# Patient Record
Sex: FEMALE | Race: WHITE | Employment: OTHER | URBAN - METROPOLITAN AREA
[De-identification: names, ages, dates, MRNs, and addresses within clinical notes are randomized per-mention and may not be internally consistent; named-entity substitution may affect disease eponyms.]

---

## 2018-01-17 PROBLEM — R91.1 LUNG NODULE: Status: ACTIVE | Noted: 2018-01-17

## 2018-01-17 PROBLEM — R59.1 LYMPHADENOPATHY: Status: ACTIVE | Noted: 2018-01-17

## 2018-02-08 PROBLEM — R13.10 DIFFICULTY SWALLOWING: Status: ACTIVE | Noted: 2018-02-08

## 2018-02-27 PROBLEM — I10 ESSENTIAL HYPERTENSION: Status: ACTIVE | Noted: 2018-02-27

## 2018-04-13 ENCOUNTER — OFFICE VISIT (OUTPATIENT)
Dept: PRIMARY CARE CLINIC | Age: 53
End: 2018-04-13
Payer: COMMERCIAL

## 2018-04-13 VITALS
OXYGEN SATURATION: 96 % | HEIGHT: 65 IN | HEART RATE: 87 BPM | WEIGHT: 283 LBS | BODY MASS INDEX: 47.15 KG/M2 | TEMPERATURE: 97.9 F | DIASTOLIC BLOOD PRESSURE: 86 MMHG | SYSTOLIC BLOOD PRESSURE: 128 MMHG

## 2018-04-13 DIAGNOSIS — R18.8 OTHER ASCITES: ICD-10-CM

## 2018-04-13 DIAGNOSIS — I10 ESSENTIAL HYPERTENSION: ICD-10-CM

## 2018-04-13 DIAGNOSIS — M25.552 LEFT HIP PAIN: Primary | ICD-10-CM

## 2018-04-13 DIAGNOSIS — E78.5 HYPERLIPIDEMIA, UNSPECIFIED HYPERLIPIDEMIA TYPE: ICD-10-CM

## 2018-04-13 PROCEDURE — 99213 OFFICE O/P EST LOW 20 MIN: CPT | Performed by: INTERNAL MEDICINE

## 2018-05-20 LAB
ALBUMIN SERPL-MCNC: 3.7 G/DL
ALP BLD-CCNC: 85 U/L
ALT SERPL-CCNC: 32 U/L
ANION GAP SERPL CALCULATED.3IONS-SCNC: NORMAL MMOL/L
AST SERPL-CCNC: 20 U/L
BASOPHILS ABSOLUTE: 0.1 /ΜL
BASOPHILS RELATIVE PERCENT: 0.5 %
BILIRUB SERPL-MCNC: 0.3 MG/DL (ref 0.1–1.4)
BUN BLDV-MCNC: 13 MG/DL
CALCIUM SERPL-MCNC: 8.7 MG/DL
CHLORIDE BLD-SCNC: 104 MMOL/L
CHOLESTEROL, TOTAL: 234 MG/DL
CHOLESTEROL/HDL RATIO: ABNORMAL
CO2: 30 MMOL/L
CREAT SERPL-MCNC: 0.63 MG/DL
EOSINOPHILS ABSOLUTE: 0.3 /ΜL
EOSINOPHILS RELATIVE PERCENT: 3.5 %
GFR CALCULATED: >60
GLUCOSE BLD-MCNC: 92 MG/DL
HCT VFR BLD CALC: 43.7 % (ref 36–46)
HDLC SERPL-MCNC: 32 MG/DL (ref 35–70)
HEMOGLOBIN: 13.7 G/DL (ref 12–16)
LDL CHOLESTEROL CALCULATED: 132 MG/DL (ref 0–160)
LYMPHOCYTES ABSOLUTE: 2 /ΜL
LYMPHOCYTES RELATIVE PERCENT: 20.2 %
MCH RBC QN AUTO: 27.8 PG
MCHC RBC AUTO-ENTMCNC: 31.4 G/DL
MCV RBC AUTO: 88.8 FL
MONOCYTES ABSOLUTE: 0.6 /ΜL
MONOCYTES RELATIVE PERCENT: 6 %
NEUTROPHILS ABSOLUTE: 6.7 /ΜL
NEUTROPHILS RELATIVE PERCENT: 68.7 %
PLATELET # BLD: 304 K/ΜL
PMV BLD AUTO: 10 FL
POTASSIUM SERPL-SCNC: 4.2 MMOL/L
RBC # BLD: 4.92 10^6/ΜL
SODIUM BLD-SCNC: 140 MMOL/L
TOTAL PROTEIN: 8.4
TRIGL SERPL-MCNC: 202 MG/DL
VLDLC SERPL CALC-MCNC: 40 MG/DL
WBC # BLD: 9.8 10^3/ML

## 2018-05-21 DIAGNOSIS — I10 ESSENTIAL HYPERTENSION: ICD-10-CM

## 2018-05-21 DIAGNOSIS — E78.5 HYPERLIPIDEMIA, UNSPECIFIED HYPERLIPIDEMIA TYPE: ICD-10-CM

## 2018-05-22 ENCOUNTER — TELEPHONE (OUTPATIENT)
Dept: PRIMARY CARE CLINIC | Age: 53
End: 2018-05-22

## 2018-05-24 ENCOUNTER — HOSPITAL ENCOUNTER (OUTPATIENT)
Age: 53
Discharge: HOME | End: 2018-05-26
Payer: COMMERCIAL

## 2018-05-24 ENCOUNTER — OFFICE VISIT (OUTPATIENT)
Dept: PRIMARY CARE CLINIC | Age: 53
End: 2018-05-24
Payer: COMMERCIAL

## 2018-05-24 VITALS
HEART RATE: 87 BPM | OXYGEN SATURATION: 96 % | BODY MASS INDEX: 46.98 KG/M2 | DIASTOLIC BLOOD PRESSURE: 88 MMHG | SYSTOLIC BLOOD PRESSURE: 138 MMHG | TEMPERATURE: 97.5 F | HEIGHT: 65 IN | WEIGHT: 282 LBS

## 2018-05-24 DIAGNOSIS — R60.9 SWELLING: ICD-10-CM

## 2018-05-24 DIAGNOSIS — M79.605 PAIN OF LEFT LOWER EXTREMITY: ICD-10-CM

## 2018-05-24 DIAGNOSIS — R53.83 FATIGUE, UNSPECIFIED TYPE: ICD-10-CM

## 2018-05-24 DIAGNOSIS — I10 ESSENTIAL HYPERTENSION: Primary | ICD-10-CM

## 2018-05-24 LAB
C-REACTIVE PROTEIN: 1.8 MG/DL (ref 0–0.4)
RHEUMATOID FACTOR: <10 IU/ML (ref 0–13)
TSH SERPL DL<=0.05 MIU/L-ACNC: 3.81 UIU/ML (ref 0.27–4.2)

## 2018-05-24 PROCEDURE — 84443 ASSAY THYROID STIM HORMONE: CPT

## 2018-05-24 PROCEDURE — 99214 OFFICE O/P EST MOD 30 MIN: CPT | Performed by: INTERNAL MEDICINE

## 2018-05-24 PROCEDURE — 86140 C-REACTIVE PROTEIN: CPT

## 2018-05-24 PROCEDURE — 86038 ANTINUCLEAR ANTIBODIES: CPT

## 2018-05-24 PROCEDURE — 85651 RBC SED RATE NONAUTOMATED: CPT

## 2018-05-24 PROCEDURE — 86431 RHEUMATOID FACTOR QUANT: CPT

## 2018-05-24 PROCEDURE — 86200 CCP ANTIBODY: CPT

## 2018-05-24 RX ORDER — LOSARTAN POTASSIUM 100 MG/1
100 TABLET ORAL DAILY
Qty: 30 TABLET | Refills: 5 | Status: SHIPPED | OUTPATIENT
Start: 2018-05-24 | End: 2018-05-31 | Stop reason: SDUPTHER

## 2018-05-25 LAB
ANTI-NUCLEAR ANTIBODY (ANA): NEGATIVE
SEDIMENTATION RATE, ERYTHROCYTE: 36 MM/HR (ref 0–20)

## 2018-05-26 LAB — CCP IGG ANTIBODIES: 30 UNITS (ref 0–19)

## 2018-05-27 ENCOUNTER — HOSPITAL ENCOUNTER (OUTPATIENT)
Dept: ULTRASOUND IMAGING | Age: 53
Discharge: HOME | End: 2018-05-29
Payer: COMMERCIAL

## 2018-05-27 DIAGNOSIS — R18.8 OTHER ASCITES: ICD-10-CM

## 2018-05-27 PROCEDURE — 76700 US EXAM ABDOM COMPLETE: CPT

## 2018-05-31 ENCOUNTER — TELEPHONE (OUTPATIENT)
Dept: PRIMARY CARE CLINIC | Age: 53
End: 2018-05-31

## 2018-05-31 DIAGNOSIS — I10 ESSENTIAL HYPERTENSION: ICD-10-CM

## 2018-05-31 RX ORDER — LOSARTAN POTASSIUM 100 MG/1
100 TABLET ORAL DAILY
Qty: 30 TABLET | Refills: 5 | Status: SHIPPED | OUTPATIENT
Start: 2018-05-31 | End: 2018-12-27 | Stop reason: SDUPTHER

## 2018-06-19 ENCOUNTER — OFFICE VISIT (OUTPATIENT)
Dept: PRIMARY CARE CLINIC | Age: 53
End: 2018-06-19
Payer: COMMERCIAL

## 2018-06-19 VITALS
HEART RATE: 76 BPM | SYSTOLIC BLOOD PRESSURE: 150 MMHG | TEMPERATURE: 97 F | BODY MASS INDEX: 47.09 KG/M2 | WEIGHT: 283 LBS | OXYGEN SATURATION: 96 % | DIASTOLIC BLOOD PRESSURE: 90 MMHG

## 2018-06-19 DIAGNOSIS — M79.89 LEG SWELLING: ICD-10-CM

## 2018-06-19 DIAGNOSIS — M79.652 PAIN OF LEFT THIGH: ICD-10-CM

## 2018-06-19 DIAGNOSIS — R10.9 ABDOMINAL PAIN, UNSPECIFIED ABDOMINAL LOCATION: ICD-10-CM

## 2018-06-19 DIAGNOSIS — J32.9 CHRONIC CONGESTION OF PARANASAL SINUS: Primary | ICD-10-CM

## 2018-06-19 PROCEDURE — 99214 OFFICE O/P EST MOD 30 MIN: CPT | Performed by: INTERNAL MEDICINE

## 2018-06-19 RX ORDER — FUROSEMIDE 40 MG/1
40 TABLET ORAL DAILY
Qty: 30 TABLET | Refills: 3 | Status: SHIPPED | OUTPATIENT
Start: 2018-06-19 | End: 2019-03-22 | Stop reason: SDUPTHER

## 2018-07-08 ENCOUNTER — HOSPITAL ENCOUNTER (OUTPATIENT)
Dept: CT IMAGING | Age: 53
Discharge: HOME | End: 2018-07-10
Payer: COMMERCIAL

## 2018-07-08 DIAGNOSIS — R10.9 ABDOMINAL PAIN, UNSPECIFIED ABDOMINAL LOCATION: ICD-10-CM

## 2018-07-08 PROCEDURE — 74176 CT ABD & PELVIS W/O CONTRAST: CPT

## 2018-07-09 ENCOUNTER — TELEPHONE (OUTPATIENT)
Dept: PRIMARY CARE CLINIC | Age: 53
End: 2018-07-09

## 2018-07-09 DIAGNOSIS — R32 BLADDER LEAK: Primary | ICD-10-CM

## 2018-07-10 ENCOUNTER — TELEPHONE (OUTPATIENT)
Dept: PRIMARY CARE CLINIC | Age: 53
End: 2018-07-10

## 2018-07-10 DIAGNOSIS — R14.0 ABDOMINAL BLOATING: Primary | ICD-10-CM

## 2018-07-10 NOTE — TELEPHONE ENCOUNTER
Patient states you referred her to Dr. Rod Ba for gastro, but she was not fond of him and would like to be sent to Dr. Brandy Briceno at [phone].

## 2018-07-13 ENCOUNTER — TELEPHONE (OUTPATIENT)
Dept: PRIMARY CARE CLINIC | Age: 53
End: 2018-07-13

## 2018-07-13 DIAGNOSIS — F41.8 SITUATIONAL ANXIETY: Primary | ICD-10-CM

## 2018-07-13 RX ORDER — ALPRAZOLAM 0.25 MG/1
0.25 TABLET ORAL 3 TIMES DAILY PRN
Qty: 4 TABLET | Refills: 0 | Status: SHIPPED | OUTPATIENT
Start: 2018-07-13 | End: 2018-08-13

## 2018-10-12 ENCOUNTER — TELEPHONE (OUTPATIENT)
Dept: PRIMARY CARE CLINIC | Age: 53
End: 2018-10-12

## 2018-10-12 ENCOUNTER — OFFICE VISIT (OUTPATIENT)
Dept: ENT CLINIC | Age: 53
End: 2018-10-12
Payer: COMMERCIAL

## 2018-10-12 ENCOUNTER — TELEPHONE (OUTPATIENT)
Dept: ENT CLINIC | Age: 53
End: 2018-10-12

## 2018-10-12 VITALS
OXYGEN SATURATION: 94 % | HEART RATE: 85 BPM | WEIGHT: 275 LBS | DIASTOLIC BLOOD PRESSURE: 96 MMHG | BODY MASS INDEX: 45.82 KG/M2 | HEIGHT: 65 IN | SYSTOLIC BLOOD PRESSURE: 162 MMHG

## 2018-10-12 DIAGNOSIS — J34.89 NASAL OBSTRUCTION: Primary | ICD-10-CM

## 2018-10-12 DIAGNOSIS — J34.2 NASAL SEPTAL DEVIATION: ICD-10-CM

## 2018-10-12 DIAGNOSIS — J34.89 NASAL VALVE COLLAPSE: ICD-10-CM

## 2018-10-12 PROCEDURE — 99204 OFFICE O/P NEW MOD 45 MIN: CPT | Performed by: OTOLARYNGOLOGY

## 2018-10-12 RX ORDER — FLUTICASONE PROPIONATE 50 MCG
2 SPRAY, SUSPENSION (ML) NASAL DAILY
Qty: 1 BOTTLE | Refills: 3 | Status: SHIPPED | OUTPATIENT
Start: 2018-10-12 | End: 2019-05-28

## 2018-12-27 ENCOUNTER — HOSPITAL ENCOUNTER (OUTPATIENT)
Age: 53
Discharge: HOME | End: 2018-12-29
Payer: COMMERCIAL

## 2018-12-27 ENCOUNTER — OFFICE VISIT (OUTPATIENT)
Dept: PRIMARY CARE CLINIC | Age: 53
End: 2018-12-27
Payer: COMMERCIAL

## 2018-12-27 VITALS
WEIGHT: 290 LBS | SYSTOLIC BLOOD PRESSURE: 138 MMHG | OXYGEN SATURATION: 97 % | BODY MASS INDEX: 48.26 KG/M2 | DIASTOLIC BLOOD PRESSURE: 86 MMHG | TEMPERATURE: 98.2 F | HEART RATE: 86 BPM

## 2018-12-27 DIAGNOSIS — I10 ESSENTIAL HYPERTENSION: ICD-10-CM

## 2018-12-27 DIAGNOSIS — M25.569 CHRONIC KNEE PAIN, UNSPECIFIED LATERALITY: ICD-10-CM

## 2018-12-27 DIAGNOSIS — F32.A DEPRESSION, UNSPECIFIED DEPRESSION TYPE: Primary | ICD-10-CM

## 2018-12-27 DIAGNOSIS — G89.29 CHRONIC KNEE PAIN, UNSPECIFIED LATERALITY: ICD-10-CM

## 2018-12-27 DIAGNOSIS — E66.01 CLASS 3 SEVERE OBESITY DUE TO EXCESS CALORIES WITHOUT SERIOUS COMORBIDITY WITH BODY MASS INDEX (BMI) OF 45.0 TO 49.9 IN ADULT (HCC): ICD-10-CM

## 2018-12-27 LAB
ANION GAP SERPL CALCULATED.3IONS-SCNC: 10 MMOL/L (ref 7–16)
BUN BLDV-MCNC: 14 MG/DL (ref 6–20)
CALCIUM SERPL-MCNC: 9.2 MG/DL (ref 8.6–10.2)
CHLORIDE BLD-SCNC: 99 MMOL/L (ref 98–107)
CO2: 32 MMOL/L (ref 22–29)
CREAT SERPL-MCNC: 0.9 MG/DL (ref 0.5–1)
GFR AFRICAN AMERICAN: >60
GFR NON-AFRICAN AMERICAN: >60 ML/MIN/1.73
GLUCOSE BLD-MCNC: 107 MG/DL (ref 74–99)
HCT VFR BLD CALC: 43.4 % (ref 34–48)
HEMOGLOBIN: 13.5 G/DL (ref 11.5–15.5)
MCH RBC QN AUTO: 27.7 PG (ref 26–35)
MCHC RBC AUTO-ENTMCNC: 31.1 % (ref 32–34.5)
MCV RBC AUTO: 89.1 FL (ref 80–99.9)
PDW BLD-RTO: 13.7 FL (ref 11.5–15)
PLATELET # BLD: 280 E9/L (ref 130–450)
PMV BLD AUTO: 11 FL (ref 7–12)
POTASSIUM SERPL-SCNC: 4.3 MMOL/L (ref 3.5–5)
RBC # BLD: 4.87 E12/L (ref 3.5–5.5)
SODIUM BLD-SCNC: 141 MMOL/L (ref 132–146)
WBC # BLD: 9.6 E9/L (ref 4.5–11.5)

## 2018-12-27 PROCEDURE — 80048 BASIC METABOLIC PNL TOTAL CA: CPT

## 2018-12-27 PROCEDURE — 99214 OFFICE O/P EST MOD 30 MIN: CPT | Performed by: INTERNAL MEDICINE

## 2018-12-27 PROCEDURE — 85027 COMPLETE CBC AUTOMATED: CPT

## 2018-12-27 RX ORDER — LOSARTAN POTASSIUM 100 MG/1
100 TABLET ORAL DAILY
Qty: 30 TABLET | Refills: 5 | Status: SHIPPED | OUTPATIENT
Start: 2018-12-27 | End: 2019-03-22 | Stop reason: SDUPTHER

## 2018-12-27 RX ORDER — SERTRALINE HYDROCHLORIDE 25 MG/1
25 TABLET, FILM COATED ORAL DAILY
Qty: 30 TABLET | Refills: 1 | Status: SHIPPED | OUTPATIENT
Start: 2018-12-27 | End: 2019-05-28

## 2018-12-27 NOTE — PATIENT INSTRUCTIONS
Patient Education        Advance Care Planning: Care Instructions  Your Care Instructions    It can be hard to live with an illness that cannot be cured. But if your health is getting worse, you may want to make decisions about end-of-life care. Planning for the end of your life does not mean that you are giving up. It is a way to make sure that your wishes are met. Clearly stating your wishes can make it easier for your loved ones. Making plans while you are still able may also ease your mind and make your final days less stressful and more meaningful. Follow-up care is a key part of your treatment and safety. Be sure to make and go to all appointments, and call your doctor if you are having problems. It's also a good idea to know your test results and keep a list of the medicines you take. What can you do to plan for the end of life? · You can bring these issues up with your doctor. You do not need to wait until your doctor starts the conversation. You might start with \"I would not be willing to live with . Neisha Yoana Neisha Yoana Neisha Yoana \" When you complete this sentence it helps your doctor understand your wishes. · Talk openly and honestly with your doctor. This is the best way to understand the decisions you will need to make as your health changes. Know that you can always change your mind. · Ask your doctor about commonly used life-support measures. These include tube feedings, breathing machines, and fluids given through a vein (IV). Understanding these treatments will help you decide whether you want them. · You may choose to have these life-supporting treatments for a limited time. This allows a trial period to see whether they will help you. You may also decide that you want your doctor to take only certain measures to keep you alive. It is important to spell out these conditions so that your doctor and family understand them. · Talk to your doctor about how long you are likely to live.  He or she may be able to give you an idea of what usually happens with your specific illness. · Think about preparing papers that state your wishes. This way there will not be any confusion about what you want. You can change your instructions at any time. Which papers should you prepare? Advance directives are legal papers that tell doctors how you want to be cared for at the end of your life. You do not need a  to write these papers. Ask your doctor or your state health department for information on how to write your advance directives. They may have the forms for each of these types of papers. Make sure your doctor has a copy of these on file, and give a copy to a family member or close friend. · Consider a do-not-resuscitate order (DNR). This order asks that no extra treatments be done if your heart stops or you stop breathing. Extra treatments may include cardiopulmonary resuscitation (CPR), electrical shock to restart your heart, or a machine to breathe for you. If you decide to have a DNR order, ask your doctor to explain and write it. Place the order in your home where everyone can easily see it. · Consider a living will. A living will explains your wishes about life support and other treatments at the end of your life if you become unable to speak for yourself. Living wong tell doctors to use or not use treatments that would keep you alive. You must have one or two witnesses or a notary present when you sign this form. · Consider a durable power of  for health care. This allows you to name a person to make decisions about your care if you are not able to. Most people ask a close friend or family member. Talk to this person about the kinds of treatments you want and those that you do not want. Make sure this person understands your wishes. These legal papers are simple to change. Tell your doctor what you want to change, and ask him or her to make a note in your medical file.  Give your family updated copies of the papers. Where can you learn more? Go to [email or web address]://[email or web address] org and sign in to your [employer] account. Enter P184 in the SIGKAT box to learn more about \"Advance Care Planning: Care Instructions. \"     If you do not have an account, please click on the \"Sign Up Now\" link. Current as of: April 19, 2018  Content Version: 11.8  © 5600-2255 Healthwise, Incorporated. Care instructions adapted under license by [facility] ([facility]). If you have questions about a medical condition or this instruction, always ask your healthcare professional. [address] any warranty or liability for your use of this information.

## 2018-12-27 NOTE — PROGRESS NOTES
12/27/18    Karlo Perez, a female of 46 y.o. came to the office     Karlo Perez presents today for blood pressure check. She has a history of chronic knee pain, edema obesity sarcoidosis and hypertension. She has gained approximately 15 pounds since her last appointment. She denies any changes in her exercise or dietary habits. She also has problems with both anxiety and depression. She is caring for several grandchildren. Her  also has end-stage heart failure is due to have a heart transplant. She also has been following up with rheumatology and was started on plaque little. He has not noticed much change since initiating this medication. She does follow up with orthopedics for her hip and knee pain. She last received a knee injection. She was recommended to have a hip injection but is holding off on this. She also intimately takes Lasix for swelling. Patient Active Problem List   Diagnosis    Lung nodule    Class 3 obesity in adult    Essential hypertension        No Known Allergies    Current Outpatient Prescriptions on File Prior to Visit   Medication Sig Dispense Refill    fluticasone (FLONASE) 50 MCG/ACT nasal spray 2 sprays by Nasal route daily 1 Bottle 3    furosemide (LASIX) 40 MG tablet Take 1 tablet by mouth daily 30 tablet 3    aspirin 81 MG tablet Take 81 mg by mouth daily       No current facility-administered medications on file prior to visit. Review of Systems  Constitutional:Negative for activity change, appetite change, chills, fatigue and fever. Respiratory: Negative for choking, chest tightness, shortness of breath and wheezing. Cardiovascular: Negative for chest pain, palpitations and leg swelling. Gastrointestinal: Negative for abdominal distention, constipation, diarrhea, nausea and vomiting. Musculoskeletal: Negative for arthralgias, back pain, gait problem and joint swelling. Neurological: Negative for dizziness, weakness,numbness and headaches.

## 2019-03-18 LAB
ANGIOTENSIN CONVERTING ENZYME: 51 U/L (ref 14–82)
ANTINUCLEAR ANTIBODIES (ANA): POSITIVE
LYME IGG/IGM AB: <0.91 ISR (ref 0–0.9)
Lab: ABNORMAL
RPR: NON REACTIVE
SPECKLED PATTERN: ABNORMAL

## 2019-03-22 ENCOUNTER — OFFICE VISIT (OUTPATIENT)
Dept: PRIMARY CARE CLINIC | Age: 54
End: 2019-03-22
Payer: COMMERCIAL

## 2019-03-22 VITALS
BODY MASS INDEX: 48.92 KG/M2 | WEIGHT: 293 LBS | DIASTOLIC BLOOD PRESSURE: 70 MMHG | OXYGEN SATURATION: 96 % | HEART RATE: 94 BPM | SYSTOLIC BLOOD PRESSURE: 124 MMHG | TEMPERATURE: 97 F

## 2019-03-22 DIAGNOSIS — I10 ESSENTIAL HYPERTENSION: ICD-10-CM

## 2019-03-22 DIAGNOSIS — M79.2 NEUROPATHIC PAIN: ICD-10-CM

## 2019-03-22 DIAGNOSIS — M79.89 LEG SWELLING: ICD-10-CM

## 2019-03-22 DIAGNOSIS — R04.0 BLEEDING NOSE: ICD-10-CM

## 2019-03-22 DIAGNOSIS — F32.A DEPRESSION, UNSPECIFIED DEPRESSION TYPE: Primary | ICD-10-CM

## 2019-03-22 PROCEDURE — 99214 OFFICE O/P EST MOD 30 MIN: CPT | Performed by: INTERNAL MEDICINE

## 2019-03-22 RX ORDER — SODIUM CHLORIDE/ALOE VERA
GEL (GRAM) NASAL PRN
Qty: 1 TUBE | Refills: 3 | Status: SHIPPED | OUTPATIENT
Start: 2019-03-22 | End: 2019-05-28

## 2019-03-22 RX ORDER — DULOXETIN HYDROCHLORIDE 20 MG/1
20 CAPSULE, DELAYED RELEASE ORAL DAILY
Qty: 30 CAPSULE | Refills: 3 | Status: SHIPPED | OUTPATIENT
Start: 2019-03-22 | End: 2019-05-28 | Stop reason: SDUPTHER

## 2019-03-22 RX ORDER — PREDNISOLONE ACETATE 10 MG/ML
1 SUSPENSION/ DROPS OPHTHALMIC 4 TIMES DAILY
COMMUNITY
End: 2020-01-22

## 2019-03-22 RX ORDER — CYCLOPENTOLATE HYDROCHLORIDE 10 MG/ML
1 SOLUTION/ DROPS OPHTHALMIC ONCE
COMMUNITY
End: 2020-01-29

## 2019-03-22 RX ORDER — FUROSEMIDE 40 MG/1
40 TABLET ORAL DAILY
Qty: 30 TABLET | Refills: 5 | Status: SHIPPED | OUTPATIENT
Start: 2019-03-22 | End: 2019-05-28 | Stop reason: SDUPTHER

## 2019-03-22 RX ORDER — LOSARTAN POTASSIUM 100 MG/1
100 TABLET ORAL DAILY
Qty: 30 TABLET | Refills: 5 | Status: SHIPPED | OUTPATIENT
Start: 2019-03-22 | End: 2019-05-28 | Stop reason: SDUPTHER

## 2019-03-22 ASSESSMENT — PATIENT HEALTH QUESTIONNAIRE - PHQ9
SUM OF ALL RESPONSES TO PHQ QUESTIONS 1-9: 0
1. LITTLE INTEREST OR PLEASURE IN DOING THINGS: 0
2. FEELING DOWN, DEPRESSED OR HOPELESS: 0
SUM OF ALL RESPONSES TO PHQ9 QUESTIONS 1 & 2: 0
SUM OF ALL RESPONSES TO PHQ QUESTIONS 1-9: 0

## 2019-05-28 ENCOUNTER — OFFICE VISIT (OUTPATIENT)
Dept: PRIMARY CARE CLINIC | Age: 54
End: 2019-05-28
Payer: COMMERCIAL

## 2019-05-28 ENCOUNTER — TELEPHONE (OUTPATIENT)
Dept: PRIMARY CARE CLINIC | Age: 54
End: 2019-05-28

## 2019-05-28 VITALS
DIASTOLIC BLOOD PRESSURE: 88 MMHG | OXYGEN SATURATION: 97 % | TEMPERATURE: 97.5 F | HEART RATE: 98 BPM | BODY MASS INDEX: 47.43 KG/M2 | SYSTOLIC BLOOD PRESSURE: 138 MMHG | WEIGHT: 285 LBS

## 2019-05-28 DIAGNOSIS — M79.2 NEUROPATHIC PAIN: ICD-10-CM

## 2019-05-28 DIAGNOSIS — F32.A DEPRESSION, UNSPECIFIED DEPRESSION TYPE: ICD-10-CM

## 2019-05-28 DIAGNOSIS — I10 ESSENTIAL HYPERTENSION: ICD-10-CM

## 2019-05-28 DIAGNOSIS — M79.89 LEG SWELLING: ICD-10-CM

## 2019-05-28 PROCEDURE — 99214 OFFICE O/P EST MOD 30 MIN: CPT | Performed by: INTERNAL MEDICINE

## 2019-05-28 RX ORDER — HYDROXYZINE HYDROCHLORIDE 25 MG/1
25 TABLET, FILM COATED ORAL 3 TIMES DAILY PRN
Qty: 30 TABLET | Refills: 1 | Status: SHIPPED | OUTPATIENT
Start: 2019-05-28 | End: 2019-06-27

## 2019-05-28 RX ORDER — DULOXETINE 40 MG/1
20 CAPSULE, DELAYED RELEASE ORAL DAILY
Qty: 30 CAPSULE | Refills: 1 | Status: SHIPPED | OUTPATIENT
Start: 2019-05-28 | End: 2019-07-15 | Stop reason: SDUPTHER

## 2019-05-28 RX ORDER — NAPROXEN 500 MG/1
500 TABLET ORAL 2 TIMES DAILY PRN
Qty: 60 TABLET | Refills: 5 | Status: SHIPPED | OUTPATIENT
Start: 2019-05-28 | End: 2020-01-22

## 2019-05-28 RX ORDER — FUROSEMIDE 40 MG/1
40 TABLET ORAL DAILY
Qty: 30 TABLET | Refills: 5 | Status: SHIPPED | OUTPATIENT
Start: 2019-05-28 | End: 2020-01-29

## 2019-05-28 RX ORDER — LOSARTAN POTASSIUM 100 MG/1
100 TABLET ORAL DAILY
Qty: 30 TABLET | Refills: 5 | Status: SHIPPED | OUTPATIENT
Start: 2019-05-28 | End: 2020-01-29 | Stop reason: SDUPTHER

## 2019-05-28 RX ORDER — DULOXETIN HYDROCHLORIDE 20 MG/1
20 CAPSULE, DELAYED RELEASE ORAL DAILY
Qty: 30 CAPSULE | Refills: 3 | Status: CANCELLED | OUTPATIENT
Start: 2019-05-28

## 2019-05-28 NOTE — PROGRESS NOTES
Eyes: Left eye exhibits no discharge. No scleral icterus. Neck: No tracheal deviation present. No thyromegalypresent. Cardiovascular: Normal rate, regular rhythm, normal heart sounds and intact distal pulses. Exam reveals no gallop and no friction rub. No murmur heard. Pulmonary/Chest: Effort normal and breath sounds normal. No respiratory distress. She has no wheezes. no rales. no tenderness. Musculoskeletal:  no tenderness. Neurological:alert and oriented to person, place, and time. Skin: No diaphoresis. Psychiatric: Tearful at times when discussing the passing of her      ASSESSMENT AND PLAN:    Jalen Mejia was seen today for anxiety. Diagnoses and all orders for this visit:    Leg swelling  -     furosemide (LASIX) 40 MG tablet; Take 1 tablet by mouth daily    Essential hypertension  -     losartan (COZAAR) 100 MG tablet; Take 1 tablet by mouth daily    Depression, unspecified depression type  -     DULoxetine HCl 40 MG CPEP; Take 20 mg by mouth daily  -     hydrOXYzine (ATARAX) 25 MG tablet; Take 1 tablet by mouth 3 times daily as needed for Anxiety    Neuropathic pain  -     naproxen (NAPROSYN) 500 MG tablet; Take 1 tablet by mouth 2 times daily as needed for Pain  -     DULoxetine HCl 40 MG CPEP; Take 20 mg by mouth daily        Tierney Arriola presents today to address her anxiety and depression. Her  unfortunately passed away unexpectedly recently. Today I will increase her Cymbalta to 40 mg. I will also add on Atarax as needed for anxiety. Her chronic issues are stable. I will continue her Cozaar Lasix and Naprosyn for now. She is following up actively with rheumatology and is due to see them today. I will also give her paperwork to excuse her from work while she is grieving. We did discuss the possibility of psychotherapy. She refuses this today as it has not worked in the past. I will continue to address these issues with her moving forward.      Please note that the above documentation was prepared using voice recognition software. Every attempt was made to ensure accuracy but there may be spelling, grammatical, and contextual errors. No follow-ups on file.     Ivette Calvo, DO

## 2019-05-28 NOTE — TELEPHONE ENCOUNTER
[facility] called stating they received a Rx for Cymbalta 40mg but the instructions say to take 20mg dialy. Please clarify. Thank you!

## 2019-05-28 NOTE — LETTER
[address] [facility] [id number]  Phone: [phone]  Fax: [address] [place], [place]        May 28, 2019     Patient: Sejal Segovia   YOB: 1965   Date of Visit: 5/28/2019       To Whom It May Concern: It is my medical opinion that Sejal Segovia should be off work for medical reasons due to her family issues. If you have any questions or concerns, please don't hesitate to call.     Sincerely,        Jasper Christian, DO

## 2019-06-18 ENCOUNTER — OFFICE VISIT (OUTPATIENT)
Dept: RHEUMATOLOGY | Age: 54
End: 2019-06-18
Payer: COMMERCIAL

## 2019-06-18 VITALS
WEIGHT: 293 LBS | BODY MASS INDEX: 48.82 KG/M2 | HEIGHT: 65 IN | DIASTOLIC BLOOD PRESSURE: 88 MMHG | OXYGEN SATURATION: 92 % | TEMPERATURE: 97 F | SYSTOLIC BLOOD PRESSURE: 140 MMHG | HEART RATE: 100 BPM

## 2019-06-18 DIAGNOSIS — M05.79 RHEUMATOID ARTHRITIS INVOLVING MULTIPLE SITES WITH POSITIVE RHEUMATOID FACTOR (HCC): Primary | ICD-10-CM

## 2019-06-18 DIAGNOSIS — D86.9 SARCOIDOSIS: ICD-10-CM

## 2019-06-18 PROCEDURE — 99214 OFFICE O/P EST MOD 30 MIN: CPT | Performed by: INTERNAL MEDICINE

## 2019-06-18 ASSESSMENT — ROUTINE ASSESSMENT OF PATIENT INDEX DATA (RAPID3)
TOTAL RAPID3 SCORE: 14
ON A SCALE OF ONE TO TEN, CONSIDERING ALL THE WAYS IN WHICH ILLNESS AND HEALTH CONDITIONS MAY AFFECT YOU AT THIS TIME, PLEASE INDICATE BELOW HOW YOU ARE DOING:: 6
ON A SCALE OF ONE TO TEN, HOW MUCH PAIN HAVE YOU HAD BECAUSE OF YOUR CONDITION OVER THE PAST WEEK?: 8

## 2019-06-18 NOTE — PATIENT INSTRUCTIONS
increase your chances of quitting for good. · Avoid dust, smoke, and fumes. They can harm your lungs. · Drink plenty of fluids, enough so that your urine is light yellow or clear like water. If you have kidney, heart, or liver disease and have to limit fluids, talk with your doctor before you increase the amount of fluids you drink. · If your doctor recommends it, get more exercise. Walking is a good choice. Bit by bit, increase the amount you walk every day. Try for at least 30 minutes on most days of the week. You also may want to swim, bike, or do other activities. When should you call for help? Call 911 anytime you think you may need emergency care. For example, call if:    · You have severe trouble breathing.     · You passed out (lost consciousness).    Call your doctor now or seek immediate medical care if:    · You have changes in your vision.     · You are very tired, get confused, or urinate a lot.     · Your symptoms do not get better, or they get worse.    Watch closely for changes in your health, and be sure to contact your doctor if you have any problems. Where can you learn more? Go to https://Red [email or web address] [email or web address].Codeanywhere. org and sign in to your [facility] account. Enter [id number] in the [place] box to learn more about \"Sarcoidosis: Care Instructions. \"     If you do not have an account, please click on the \"Sign Up Now\" link. Current as of: September 5, 2018  Content Version: 12.0  © 7965-4915 Healthwise, Incorporated. Care instructions adapted under license by [facility] ([facility]). If you have questions about a medical condition or this instruction, always ask your healthcare professional. Wadie Pean any warranty or liability for your use of this information.          Patient Education        methotrexate (oral)  Pronunciation:  meth oh TREX ate  Brand:  Rheumatrex Dose Pack, VíctorxaSandy newman  What is the most important information I should know about methotrexate? Methotrexate is usually not taken every day. You must use the correct dose of methotrexate for your condition. Some people have  after taking methotrexate every day by accident. Do not use methotrexate to treat psoriasis or rheumatoid arthritis if you have low blood cell counts, a bone marrow disorder, liver disease (especially if caused by alcoholism), or if you are pregnant or breast-feeding. Methotrexate can cause serious or life-threatening side effects. Tell your doctor if you have diarrhea, mouth sores, cough, shortness of breath, upper stomach pain, dark urine, numbness or tingling, muscle weakness, confusion, seizure, or skin rash that spreads and causes blistering and peeling. What is methotrexate? Methotrexate interferes with the growth of certain cells of the body, especially cells that reproduce quickly, such as cancer cells, bone marrow cells, and skin cells. Methotrexate is used to treat certain types of cancer of the breast, skin, head and neck, or lung. Methotrexate is also used to treat severe psoriasis and rheumatoid arthritis. Methotrexate is usually given after other medications have been tried without successful treatment of symptoms. Methotrexate may also be used for purposes not listed in this medication guide. What should I discuss with my healthcare provider before taking methotrexate? You should not use methotrexate if you are allergic to it. Methotrexate should not be used to treat psoriasis or rheumatoid arthritis if you have:  · alcoholism, cirrhosis, or chronic liver disease;  · low blood cell counts;  · a weak immune system or bone marrow disorder; or  · if you are pregnant or breast-feeding. Methotrexate is sometimes used to treat cancer even when patients do have one of the conditions listed above. Your doctor will decide if this treatment is right for you.   Tell your doctor if you have:  · kidney disease;  · lung disease;  · any type of infection; or  · radiation treatments. Methotrexate can harm an unborn baby or cause birth defects, whether the mother or father is taking this medicine. · If you are a woman, do not use methotrexate to treat psoriasis or rheumatoid arthritis if you are pregnant. You may need to have a negative pregnancy test before starting this treatment. Use an effective form of birth control while you are taking methotrexate, and for 6 months after your last dose. · If you are a man, use a condom to keep from causing a pregnancy while you are using methotrexate. Continue using condoms for at least 3 months after your last dose. · Tell your doctor right away if a pregnancy occurs while either the mother or the father is taking methotrexate. This medicine may affect fertility (ability to have children) in both men and women. However, it is important to use birth control to prevent pregnancy because methotrexate may harm the baby if a pregnancy does occur. You should not breast-feed while using this medicine. Do not give this medicine to a child without the advice of a doctor. How should I take methotrexate? Follow all directions on your prescription label and read all medication guides or instruction sheets. Use the medicine exactly as directed. Methotrexate is sometimes taken once or twice per week and not every day. You must use the correct dose. Some people have  after taking methotrexate every day by accident. Ask your doctor or pharmacist if you have questions about your dose of methotrexate or how often to take it. Measure liquid medicine carefully. Use the dosing syringe provided, or use a medicine dose-measuring device (not a kitchen spoon). Methotrexate can be toxic to your organs, and may lower your blood cell counts. Your blood will need to be tested often, and you may need an occasional liver biopsy. Your cancer treatments may be delayed based on the results.   If you need to be sedated for dental work, tell your your mouth or on your lips;  · diarrhea, blood in your urine or stools;  · dry cough, cough with mucus, stabbing chest pain, wheezing, feeling short of breath;  · seizure (convulsions);  · kidney problems --little or no urination, swelling in your feet or ankles;  · liver problems --stomach pain (upper right side), dark urine, jaundice (yellowing of the skin or eyes);  · nerve problems --confusion, weakness, drowsiness, coordination problems, feeling irritable, headache, neck stiffness, vision problems, loss of movement in any part of your body; or  · signs of tumor cell breakdown --confusion, tiredness, numbness or tingling, muscle cramps, muscle weakness, vomiting, diarrhea, fast or slow heart rate, seizure. Side effects may be more likely in older adults. Common side effects may include:  · fever, chills, tiredness, not feeling well;  · mouth sores;  · nausea, upset stomach;  · dizziness; or  · abnormal liver function tests. This is not a complete list of side effects and others may occur. Call your doctor for medical advice about side effects. You may report side effects to FDA at 0-376-FDA-9599. What other drugs will affect methotrexate? Methotrexate can harm your liver, especially if you also use certain other medicines for infections, tuberculosis, depression, birth control, hormone replacement, high cholesterol, heart problems, high blood pressure, seizures, or pain or arthritis medicines (including acetaminophen, Tylenol, Advil, Motrin, and Aleve). Many drugs can affect methotrexate. This includes prescription and over-the-counter medicines, vitamins, and herbal products. Not all possible interactions are listed here. Tell your doctor about all your current medicines and any medicine you start or stop using. Where can I get more information? Your pharmacist can provide more information about methotrexate.   Remember, keep this and all other medicines out of the reach of children, never share your medicines with others, and use this medication only for the indication prescribed. Every effort has been made to ensure that the information provided by [address] [place] [address]  is accurate, up-to-date, and complete, but no guarantee is made to that effect. Drug information contained herein may be time sensitive. [facility] information has been compiled for use by healthcare practitioners and consumers in the [place] and therefore [facility] does not warrant that uses outside of the [place] are appropriate, unless specifically indicated otherwise. [facility]'s drug information does not endorse drugs, diagnose patients or recommend therapy. [facility]'s drug information is an informational resource designed to assist licensed healthcare practitioners in caring for their patients and/or to serve consumers viewing this service as a supplement to, and not a substitute for, the expertise, skill, knowledge and judgment of healthcare practitioners. The absence of a warning for a given drug or drug combination in no way should be construed to indicate that the drug or drug combination is safe, effective or appropriate for any given patient. [facility] does not assume any responsibility for any aspect of healthcare administered with the aid of information [facility] provides. The information contained herein is not intended to cover all possible uses, directions, precautions, warnings, drug interactions, allergic reactions, or adverse effects. If you have questions about the drugs you are taking, check with your doctor, nurse or pharmacist.  Copyright 0587-8504 [address]: 14.01. Revision date: 3/27/2018. Care instructions adapted under license by [facility] ([facility]). If you have questions about a medical condition or this instruction, always ask your healthcare professional. [place] [address] any warranty or liability for your use of this information.

## 2019-06-18 NOTE — PROGRESS NOTES
19    Name: Karthik Lawson  : 1965  Age: 46 y.o. Sex: female    Patient is seen at the request of Jitendra Ferrer DO    Patient presents for a rheumatology consultation regarding bilateral primary iridocyclitis , Joint pain , elevated ESR , C-RP and ANTONIO. Chief Complaint   Patient presents with    Joint Pain    Joint Swelling       Prior Rheum Illness :  Chronic Bilateral Iridocyclitis diagnosed in 2019 by Dr Johana Kussmaul. Patient states that since past one year she has been experiencing intermittent episodes of \"fogginess, decrease vision at  night, with out pain , was seen by Dr Johana Kussmaul and was diagnosed with chronic bilateral iridocyclitis. Per report she has  numerous iris nodules with post synechiae at 12, 2, 3, 9 and 10 clock position, currently suing prednisolone and  cyclopentolate. diffuse swelling particularly in ankles since past few years, swelling gets worse by the end of day, symptoms improved  with diuretics. PCP has completed 2 D Echo which came back \"ok\" per patient report. States that stress test was done few  years back which was negative. At this point reports pain in multiple joints including wrists, hands (thumbs), legs, unable to hold objects,  pain and stiffness increases after long term sitting and standing, denies personal and family history of Psoriasis, IBD,  bloody diarrhea,  Was seen by Dr Kory Ramsey couple of months ago. She was given Prednisone taper and it did not help her. Follows with Orthopedic Dr Zulay Nicole for knee OA. Sister diagnosed with RA.    19:  Patient came for follow-up, labs review and further treatment plan. Reports multiple joint pain. Reports excessive dryness of eyes. In addition she has been following with ophthalmologist for ongoing symptoms of iritis. CT scan of chest was reviewed which was completed one year ago. Patient follows with pulmonologist Dr. Oleg Mccain.  Per assessment she was diagnosed with sarcoidosis based on manifestation of bilateral  mediastinal lymphadenopathy and non-caseating granuloma on BAL. Labs - ESR 62 , C-RP 27.8 elevated ,   RF neg   Anti CCP ab at 32   Hb 10.9  Hepatitis panel is neg   Protein to Cr ratio wnl        Vitals:    06/18/19 0902 06/18/19 0905   BP: (!) 142/88 (!) 140/88   Site: Left Upper Arm Right Upper Arm   Position: Sitting Sitting   Pulse: 100    Temp: 97 °F (36.1 °C)    TempSrc: Temporal    SpO2: 92%    Weight: (!) 300 lb 3.2 oz (136.2 kg)    Height: 5' 5\" (1.651 m)         Review of Systems   Constitutional: Positive for fatigue. Negative for fever and unexpected weight change. HENT: Negative for mouth sores, nosebleeds and sore throat. Eyes: Positive for photophobia and redness. Negative for pain, itching and visual disturbance. Respiratory: Negative for cough, chest tightness, shortness of breath and wheezing. Cardiovascular: Negative for chest pain, palpitations and leg swelling. Gastrointestinal: Negative for abdominal pain, blood in stool, constipation, diarrhea and vomiting. Genitourinary: Negative for dysuria, flank pain, genital sores, hematuria and urgency. Musculoskeletal: Positive for arthralgias, back pain, joint swelling and myalgias. Negative for gait problem. Skin: Negative for rash. Allergic/Immunologic: Negative for environmental allergies and food allergies. Neurological: Negative for dizziness, seizures, syncope, weakness, light-headedness, numbness and headaches. Hematological: Negative for adenopathy. Does not bruise/bleed easily. Psychiatric/Behavioral: The patient is not nervous/anxious.            Current Outpatient Medications:     naproxen (NAPROSYN) 500 MG tablet, Take 1 tablet by mouth 2 times daily as needed for Pain, Disp: 60 tablet, Rfl: 5    furosemide (LASIX) 40 MG tablet, Take 1 tablet by mouth daily, Disp: 30 tablet, Rfl: 5    losartan (COZAAR) 100 MG tablet, Take 1 tablet by mouth daily, Disp: 30 tablet, Rfl: 5   DULoxetine HCl 40 MG CPEP, Take 20 mg by mouth daily, Disp: 30 capsule, Rfl: 1    hydrOXYzine (ATARAX) 25 MG tablet, Take 1 tablet by mouth 3 times daily as needed for Anxiety, Disp: 30 tablet, Rfl: 1    prednisoLONE acetate (PRED FORTE) 1 % ophthalmic suspension, 1 drop 4 times daily, Disp: , Rfl:     cyclopentolate (CYCLOGYL) 1 % ophthalmic solution, 1 drop once, Disp: , Rfl:     aspirin 81 MG tablet, Take 81 mg by mouth daily, Disp: , Rfl:   No Known Allergies        Past Medical History:   Diagnosis Date    Hypertension     Obesity     bmi 45.5 weight 273 #    Osteoarthritis     SOB (shortness of breath)      Family History   Problem Relation Age of Onset    Diabetes Mother     Coronary Art Dis Brother       Past Surgical History:   Procedure Laterality Date    BRONCHOSCOPY  01/24/2018    Dr. Poncho Montgomery Left     TUBAL LIGATION        Social History     Socioeconomic History    Marital status:      Spouse name: Not on file    Number of children: Not on file    Years of education: Not on file    Highest education level: Not on file   Occupational History     Employer: [employer]   Social Needs    Financial resource strain: Not on file    Food insecurity:     Worry: Not on file     Inability: Not on file    Transportation needs:     Medical: Not on file     Non-medical: Not on file   Tobacco Use    Smoking status: Never Smoker    Smokeless tobacco: Never Used   Substance and Sexual Activity    Alcohol use: No    Drug use: No    Sexual activity: Yes   Lifestyle    Physical activity:     Days per week: Not on file     Minutes per session: Not on file    Stress: Not on file   Relationships    Social connections:     Talks on phone: Not on file     Gets together: Not on file     Attends Mandaeism service: Not on file     Active member of club or organization: Not on file     Attends meetings of clubs or organizations: Not on file     Relationship status: Not on file    Intimate partner violence:     Fear of current or ex partner: Not on file     Emotionally abused: Not on file     Physically abused: Not on file     Forced sexual activity: Not on file   Other Topics Concern    Not on file   Social History Narrative    Not on file      Social History     Social History Narrative    Not on file        Vitals:    06/18/19 0902 06/18/19 0905   BP: (!) 142/88 (!) 140/88   Site: Left Upper Arm Right Upper Arm   Position: Sitting Sitting   Pulse: 100    Temp: 97 °F (36.1 °C)    TempSrc: Temporal    SpO2: 92%    Weight: (!) 300 lb 3.2 oz (136.2 kg)    Height: 5' 5\" (1.651 m)         Physical Exam   Constitutional: She appears well-developed and well-nourished. No distress. HENT:   Head: Normocephalic. Right Ear: External ear normal.   Left Ear: External ear normal.   Mouth/Throat: No oropharyngeal exudate. Eyes: Pupils are equal, round, and reactive to light. Conjunctivae are normal. Right eye exhibits no discharge. Left eye exhibits no discharge. No scleral icterus. Neck: Normal range of motion. Neck supple. No thyromegaly present. Cardiovascular: Normal rate, regular rhythm, normal heart sounds and intact distal pulses. Pulmonary/Chest: Effort normal. No stridor. She has no wheezes. She has no rales. She exhibits no tenderness. Abdominal: Soft. Bowel sounds are normal. She exhibits no distension and no mass. There is no tenderness. There is no rebound and no guarding. No hernia. Genitourinary:   Genitourinary Comments: Not examined. Musculoskeletal: Normal range of motion. Multiple joints were examined and she reports tenderness on palpation of most of the joints. Mild synovitis of right ankle was noticed. Lymphadenopathy:     She has no cervical adenopathy. Skin: Skin is warm and dry. Capillary refill takes less than 2 seconds. No rash noted. She is not diaphoretic. No erythema. No pallor. Psychiatric: She has a normal mood and affect.  Her behavior is normal. Judgment and thought content normal.        Opal Narayanan was seen today for joint pain and joint swelling. Diagnoses and all orders for this visit:    Rheumatoid arthritis involving multiple sites with positive rheumatoid factor (HCC)  -     XR FOOT LEFT (2 VIEWS); Future  -     XR HAND RIGHT (2 VIEWS); Future  -     XR HAND LEFT (2 VIEWS); Future  -     XR FOOT RIGHT (2 VIEWS); Future    Sarcoidosis    Manifested as bilateral mediastinal lymphadenopathy, noncaseating granuloma on BAL, bilateral iritis  elevated ESR, CRP, inflammatory arthritis. Positive anti-CCP antibody is perhaps secondary to sarcoidosis. Of note ACE level was normal .. Chest x-ray was normal however based on above mentioned data including recurrent bilateral iritis , elevated ESR and CRP and active arthritis I believe she has active systemic sarcoidosis. I will also discuss with Dr. Daxa Villanueva as I am planning to consider high-dose steroids along with methotrexate for acute flare. Return in about 1 month (around 7/16/2019). Josh Brown MD     *This document was created using voice recognition software. Note was reviewed, however may contain grammatical errors.

## 2019-06-19 PROBLEM — D86.9 SARCOIDOSIS: Status: ACTIVE | Noted: 2019-06-19

## 2019-06-19 ASSESSMENT — ENCOUNTER SYMPTOMS
BACK PAIN: 1
EYE ITCHING: 0
WHEEZING: 0
CHEST TIGHTNESS: 0
ABDOMINAL PAIN: 0
SORE THROAT: 0
EYE REDNESS: 1
PHOTOPHOBIA: 1
CONSTIPATION: 0
DIARRHEA: 0
BLOOD IN STOOL: 0
SHORTNESS OF BREATH: 0
VOMITING: 0
EYE PAIN: 0
COUGH: 0

## 2019-06-24 ENCOUNTER — TELEPHONE (OUTPATIENT)
Dept: PULMONOLOGY | Age: 54
End: 2019-06-24

## 2019-06-24 ENCOUNTER — OFFICE VISIT (OUTPATIENT)
Dept: PULMONOLOGY | Age: 54
End: 2019-06-24
Payer: COMMERCIAL

## 2019-06-24 VITALS
HEART RATE: 108 BPM | RESPIRATION RATE: 20 BRPM | DIASTOLIC BLOOD PRESSURE: 78 MMHG | HEIGHT: 65 IN | WEIGHT: 293 LBS | BODY MASS INDEX: 48.82 KG/M2 | SYSTOLIC BLOOD PRESSURE: 172 MMHG | OXYGEN SATURATION: 92 % | TEMPERATURE: 98.2 F

## 2019-06-24 DIAGNOSIS — D86.9 SARCOIDOSIS: ICD-10-CM

## 2019-06-24 DIAGNOSIS — R91.1 LUNG NODULE: ICD-10-CM

## 2019-06-24 PROCEDURE — 99213 OFFICE O/P EST LOW 20 MIN: CPT | Performed by: INTERNAL MEDICINE

## 2019-06-24 PROCEDURE — 99214 OFFICE O/P EST MOD 30 MIN: CPT | Performed by: INTERNAL MEDICINE

## 2019-06-24 PROCEDURE — 36415 COLL VENOUS BLD VENIPUNCTURE: CPT | Performed by: INTERNAL MEDICINE

## 2019-06-24 RX ORDER — FOLIC ACID 1 MG/1
1 TABLET ORAL DAILY
Qty: 90 TABLET | Refills: 1 | Status: SHIPPED | OUTPATIENT
Start: 2019-06-24 | End: 2020-04-24 | Stop reason: SDUPTHER

## 2019-06-24 RX ORDER — FUROSEMIDE 20 MG/1
20 TABLET ORAL DAILY
Qty: 30 TABLET | Refills: 3 | Status: SHIPPED | OUTPATIENT
Start: 2019-06-24 | End: 2020-01-22

## 2019-06-24 RX ORDER — POTASSIUM CHLORIDE 750 MG/1
10 TABLET, EXTENDED RELEASE ORAL DAILY
Qty: 30 TABLET | Refills: 0 | Status: SHIPPED | OUTPATIENT
Start: 2019-06-24 | End: 2019-07-15 | Stop reason: SDUPTHER

## 2019-06-24 RX ORDER — PREDNISONE 10 MG/1
TABLET ORAL
Qty: 120 TABLET | Refills: 1 | Status: SHIPPED | OUTPATIENT
Start: 2019-06-24 | End: 2020-01-22

## 2019-06-24 NOTE — PROGRESS NOTES
Please notify patient that I was able to discuss with Dr. Lazarus Lat. Plan to start prednisone and methotrexate. I have already sent  medications for you.

## 2019-06-24 NOTE — PROGRESS NOTES
Follow up in office today. Pt saw Dr. Allen Morris for Rheumatology and Dr. Pipe Kim to discuss treatment plan with her; call to office and message left for Dr. Allen Morris to contact Dr. Daniella Adamson. Pt to have lab work in next week or so and follow up in office in 3 mos; appt given.

## 2019-06-24 NOTE — PROGRESS NOTES
I talked to Dr Divya Nagy. He agreed with Prednisone and Methotrexate.    Will start with 40 mg of Prednisone for 2 weeks followed by 30 mg once daily   Start Methotrexate at 5 tabs once weekly along with 1 mg of Folic acid

## 2019-06-24 NOTE — TELEPHONE ENCOUNTER
Call to pt to advise Dr. Solo Porras had spoken to Dr. Keri Hines and she plans to order small dose of steroids and wean pt quickly. Advised that Dr. Keri Hines will be contacting her or sending script to pharmacy. Pt to contact Dr. Keri Hines for follow up.

## 2019-06-25 NOTE — PROGRESS NOTES
Department of Internal Medicine  Division of Pulmonary, Critical Care & Sleep Medicine  Pulmonary [address] [facility]                                             Pulmonary Clinic Consult     I had the pleasure of seeing  Sejal Segovia in the [facility] regarding their lymphoadenopathy and lung nodule     Chief Complaint   Patient presents with    Follow-up       HISTORY OF PRESENT ILLNESS:    Sejal Segovia is a 46y.o. year old  Who never smoking and never exposed  for second hand smoking He quit smoking he states for the last 2 years and it has been going worse , associated with wheezing and that's when she came into the hospital and she had a CAT scan of the chest that shows mediastinal adenopathy for which the patient was referred to me    The Patient comes in with SOB that has been going on 2 ,He states that it get worse with and he can walk  Or exercise  And some times it comes at rest and wheezing and ,she can go 1 flight  Of stairs and she was prescribed inhalers but she did not     He has cough ,non productive for any thing , she stated she never had hemoptysis, she has no night sweats, no history of TB    She has Lower extremity edema and on diuretics   No heart disease     No recent infection or pneumonia ,she was never seen by pulmonologist     Recent tells me that she had chest x-rays in the past but she is not sure that she has any CAT scan done in the last 3-4 years for her chest      Patient is not sure about snoring, she is not sure about quit breathing as she sleep by herself        Today visit    She developed uveitis and it was thought it is related to her sarcoid  She was also having some weight gain recently and fluid retention   No fever   Leg swelling  No fever or chills       ALLERGIES:  No Known Allergies    PAST MEDICAL HISTORY:       Diagnosis Date    Hypertension     Obesity     bmi 45.5 weight 273 #    Osteoarthritis     SOB (shortness of breath)        MEDICATIONS:   Current Outpatient Medications   Medication Sig Dispense Refill    furosemide (LASIX) 20 MG tablet Take 1 tablet by mouth daily 30 tablet 3    potassium chloride (KLOR-CON M) 10 MEQ extended release tablet Take 1 tablet by mouth daily 30 tablet 0    naproxen (NAPROSYN) 500 MG tablet Take 1 tablet by mouth 2 times daily as needed for Pain 60 tablet 5    furosemide (LASIX) 40 MG tablet Take 1 tablet by mouth daily 30 tablet 5    losartan (COZAAR) 100 MG tablet Take 1 tablet by mouth daily 30 tablet 5    DULoxetine HCl 40 MG CPEP Take 20 mg by mouth daily 30 capsule 1    hydrOXYzine (ATARAX) 25 MG tablet Take 1 tablet by mouth 3 times daily as needed for Anxiety 30 tablet 1    prednisoLONE acetate (PRED FORTE) 1 % ophthalmic suspension 1 drop 4 times daily      cyclopentolate (CYCLOGYL) 1 % ophthalmic solution 1 drop once      aspirin 81 MG tablet Take 81 mg by mouth daily      predniSONE (DELTASONE) 10 MG tablet Take 40 mg once a day for 2 weeks followed by 30 mg once daily 120 tablet 1    methotrexate (RHEUMATREX) 2.5 MG chemo tablet Take 5 tabs once weekly 28 tablet 1    folic acid (FOLVITE) 1 MG tablet Take 1 tablet by mouth daily 90 tablet 1     No current facility-administered medications for this visit. SOCIAL AND OCCUPATIONAL HEALTH:  Social History     Tobacco Use   Smoking Status Never Smoker   Smokeless Tobacco Never Used     TB : No  Pets :No  Industrial exposure:No   Birds :No     SURGICAL HISTORY:   Past Surgical History:   Procedure Laterality Date    BRONCHOSCOPY  01/24/2018    Dr. Olga Blakely Left     TUBAL LIGATION         FAMILY HISTORY:   Lung cancer:no   DVT or PE no     REVIEW OF SYSTEMS:  Constitutional: General health is good . There has been no weight changes. No fevers, fatigue or weakness. Head: Patient denies any history of trauma, convulsive disorder or syncope.     Skin:  Patient denies history of changes in pigmentation, eruptions or pruritus. No easy bruising or bleeding. EENT: no nasal congestion   Cardiovascular ,No chest pain ,No edema ,  Respiration:SOB:+,LIU :+  Gastrointestinal:No GI bleed ,no melena  ,no hematemesis    Musculoskeletal: no joint pain ,no swelling  Neurological:no , syncope. Denies twitching, convulsions, loss of consciousness or memory. Endocrine:  . No history of goiter, exophthalmos or dryness of skin. The patient has no history of diabetes. Hematopoietic:  No history of bleeding disorders or easy bruising. Rheumatic:  No connective tissue disease history or polyarthritis/inflammatory joint disease. PHYSICAL EXAMINATION:  Vitals:    06/24/19 1040   BP: (!) 172/78   Pulse: 108   Resp: 20   Temp: 98.2 °F (36.8 °C)   SpO2: 92%     Constitutional: This is a well developed, well nourished 46y.o. year old female who is alert, oriented, cooperative and in no apparent distress. Head was normocephalic and atraumatic. EENT: Mallampati class :II  Extraocular muscles intact. External canals are patent and no discharge was appreciated. Septum was midline,   mucosa was without erythema, exudates or cobblestoning. No thrush was noted. Neck: Supple without thyromegaly. No elevated JVP. Trachea was midline. No carotid bruits were auscultated. Respiratory: Scattered rhonchi bilaterally other than that to clear no wheezing    Cardiovascular: Regular without murmur, clicks, gallops or rubs. There is no left or right ventricular heave. Pulses:  Carotid, radial and femoral pulses were equally bilaterally. Abdomen: Slightly rounded and soft without organomegaly. No rebound, rigidity or guarding was appreciated. Lymphatic: No lymphadenopathy. Musculoskeletal: No joint deformity   Extremities: Mild edema bilaterally  Skin:  Warm and dry. Good color, turgor and pigmentation. No lesions or scars.   Neurological/Psychiatric: The patient's general behavior, level of consciousness, thought content and emotional status is normal.  Cranial nerves II-XII are intact. DATA:   PFT ordered    IMPRESSION:    1-Mediastinal adenopathy with enlarged lymph nodes, subcarinal R 11, 11, are for, L5/6 that shows granulomatosis disease most likely sarcoid, giving the calcification in the lung nodules and in the spleen as well    Also waiting on history or urine antigen      3- Lung Nodule   2-cough wheezing with possible asthma  3-possible obstructive sleep apnea  4-allergic rhinitis       PLAN:      -Patient is very pleasant 58-year-old female with a history of cough wheezing shortness breath that's going on for 2 years came in for follow-up on CT scan that she had an emergency in January 13, 2018 for her shortness breath and wheezing which shows mediastinal adenopathy and lung nodule  Cytology reviewed and it did not show any malignancy, it only shows non-caseating granuloma compatible with sarcoidosis  I discuss with Dr Hillary Collins that her lung sarcoid seems stable and will do e cho to assess for cardiac   I prefer to use the lower dose possible to treat her uveitis and to discuss risk and benefits with patient and to wean as soon to Non steroids spare agent like MTX etc    Increase Lasix to 60 mg daily  Add K  If weight gain continue ,need to r/o endocrine etiology  Check BMP next week  Will see in 3 months         The patient is symptomatic this is very reassuring we will hold on any further testing will repeat CAT scan I explained to her if there is any growth in the lymph node or change in symptoms then we will start by treating her for possible sarcoidosis    I explained to her that PET scan is not helpful as sarcoid or any other granulomatosis disease scan light up in the PET scan so it would not be helpful    Will need PFT and repeat CT soon     Thank you for allowing me to participate in [facility].  I will keep following with you ,should you have any concerns ,please contact me at [phone] Sincerely,        Manolo Jenkins MD  Pulmonary & Critical Care Medicine     NOTE: This report was transcribed using voice recognition software. Every effort was made to ensure accuracy; however, inadvertent computerized transcription errors may be present.

## 2019-07-05 ENCOUNTER — TELEPHONE (OUTPATIENT)
Dept: RHEUMATOLOGY | Age: 54
End: 2019-07-05

## 2019-07-11 NOTE — TELEPHONE ENCOUNTER
I wrote the letter . I am unable to provide her work excuse for indefinite period of time so I have placed tentative date. Hopefully she should feel better by then. Otherwise she can schedule with me for further assessment.

## 2019-07-15 ENCOUNTER — OFFICE VISIT (OUTPATIENT)
Dept: PRIMARY CARE CLINIC | Age: 54
End: 2019-07-15
Payer: COMMERCIAL

## 2019-07-15 VITALS
DIASTOLIC BLOOD PRESSURE: 84 MMHG | SYSTOLIC BLOOD PRESSURE: 132 MMHG | BODY MASS INDEX: 50.92 KG/M2 | HEART RATE: 77 BPM | OXYGEN SATURATION: 96 % | WEIGHT: 293 LBS | TEMPERATURE: 98.2 F

## 2019-07-15 DIAGNOSIS — M25.569 CHRONIC KNEE PAIN, UNSPECIFIED LATERALITY: ICD-10-CM

## 2019-07-15 DIAGNOSIS — F32.A DEPRESSION, UNSPECIFIED DEPRESSION TYPE: ICD-10-CM

## 2019-07-15 DIAGNOSIS — G89.29 CHRONIC KNEE PAIN, UNSPECIFIED LATERALITY: ICD-10-CM

## 2019-07-15 DIAGNOSIS — I10 ESSENTIAL HYPERTENSION: ICD-10-CM

## 2019-07-15 DIAGNOSIS — M79.2 NEUROPATHIC PAIN: ICD-10-CM

## 2019-07-15 DIAGNOSIS — E66.01 CLASS 3 SEVERE OBESITY DUE TO EXCESS CALORIES WITHOUT SERIOUS COMORBIDITY WITH BODY MASS INDEX (BMI) OF 45.0 TO 49.9 IN ADULT (HCC): ICD-10-CM

## 2019-07-15 DIAGNOSIS — F41.8 SITUATIONAL ANXIETY: ICD-10-CM

## 2019-07-15 DIAGNOSIS — M79.89 LEG SWELLING: Primary | ICD-10-CM

## 2019-07-15 DIAGNOSIS — R32 BLADDER LEAK: ICD-10-CM

## 2019-07-15 PROCEDURE — 99214 OFFICE O/P EST MOD 30 MIN: CPT | Performed by: INTERNAL MEDICINE

## 2019-07-15 RX ORDER — HYDROXYZINE HYDROCHLORIDE 25 MG/1
25 TABLET, FILM COATED ORAL 3 TIMES DAILY PRN
Qty: 30 TABLET | Refills: 0 | Status: SHIPPED | OUTPATIENT
Start: 2019-07-15 | End: 2019-08-14

## 2019-07-15 RX ORDER — POTASSIUM CHLORIDE 750 MG/1
10 TABLET, EXTENDED RELEASE ORAL DAILY
Qty: 30 TABLET | Refills: 0 | Status: SHIPPED | OUTPATIENT
Start: 2019-07-15 | End: 2019-09-16 | Stop reason: SDUPTHER

## 2019-07-15 RX ORDER — HYDROXYCHLOROQUINE SULFATE 200 MG/1
TABLET, FILM COATED ORAL
Refills: 1 | COMMUNITY
Start: 2019-06-28 | End: 2020-01-22

## 2019-07-15 RX ORDER — DULOXETINE 40 MG/1
40 CAPSULE, DELAYED RELEASE ORAL DAILY
Qty: 30 CAPSULE | Refills: 1 | Status: SHIPPED
Start: 2019-07-15 | End: 2019-07-15

## 2019-07-15 NOTE — PATIENT INSTRUCTIONS
idea of what usually happens with your specific illness. · Think about preparing papers that state your wishes. This way there will not be any confusion about what you want. You can change your instructions at any time. Which papers should you prepare? Advance directives are legal papers that tell doctors how you want to be cared for at the end of your life. You do not need a  to write these papers. Ask your doctor or your state health department for information on how to write your advance directives. They may have the forms for each of these types of papers. Make sure your doctor has a copy of these on file, and give a copy to a family member or close friend. · Consider a do-not-resuscitate order (DNR). This order asks that no extra treatments be done if your heart stops or you stop breathing. Extra treatments may include cardiopulmonary resuscitation (CPR), electrical shock to restart your heart, or a machine to breathe for you. If you decide to have a DNR order, ask your doctor to explain and write it. Place the order in your home where everyone can easily see it. · Consider a living will. A living will explains your wishes about life support and other treatments at the end of your life if you become unable to speak for yourself. Living wong tell doctors to use or not use treatments that would keep you alive. You must have one or two witnesses or a notary present when you sign this form. · Consider a durable power of  for health care. This allows you to name a person to make decisions about your care if you are not able to. Most people ask a close friend or family member. Talk to this person about the kinds of treatments you want and those that you do not want. Make sure this person understands your wishes. These legal papers are simple to change. Tell your doctor what you want to change, and ask him or her to make a note in your medical file.  Give your family updated copies of the

## 2019-07-15 NOTE — PROGRESS NOTES
7/15/19    Robles Sabillon, a female of 46 y.o. came to the office     Robles Sabillon presents today for routine follow-up. Last appointment her  passed away. Her Cymbalta was increased and also Atarax was added to her anxiety regimen. Her daughter has been staying with her. Her mood is about the same, she does have some anxiety in stores. She has been placed on steroids for sarcoidosis. She is following with Dr. Larry Delgado from opthalmology. She has been on steroids for 3 weeks. She is following with orthopedics for her OA in the knee and hip. She has been having problems with urinary incontinence. Patient Active Problem List   Diagnosis    Lung nodule    Class 3 obesity in adult    Essential hypertension    Sarcoidosis      No Known Allergies    Current Outpatient Medications on File Prior to Visit   Medication Sig Dispense Refill    hydroxychloroquine (PLAQUENIL) 200 MG tablet TAKE 1 TABLET BY MOUTH EVERY DAY FOR 1 WEEK  THEN TAKE 1 TABLET BY MOUTH TWICE DAILY  1    furosemide (LASIX) 20 MG tablet Take 1 tablet by mouth daily 30 tablet 3    predniSONE (DELTASONE) 10 MG tablet Take 40 mg once a day for 2 weeks followed by 30 mg once daily 120 tablet 1    methotrexate (RHEUMATREX) 2.5 MG chemo tablet Take 5 tabs once weekly 28 tablet 1    folic acid (FOLVITE) 1 MG tablet Take 1 tablet by mouth daily 90 tablet 1    naproxen (NAPROSYN) 500 MG tablet Take 1 tablet by mouth 2 times daily as needed for Pain 60 tablet 5    furosemide (LASIX) 40 MG tablet Take 1 tablet by mouth daily 30 tablet 5    losartan (COZAAR) 100 MG tablet Take 1 tablet by mouth daily 30 tablet 5    prednisoLONE acetate (PRED FORTE) 1 % ophthalmic suspension 1 drop 4 times daily      aspirin 81 MG tablet Take 81 mg by mouth daily      cyclopentolate (CYCLOGYL) 1 % ophthalmic solution 1 drop once       No current facility-administered medications on file prior to visit.         Review of Systems  Constitutional:Negative for activity change, appetite change, chills, fatigue and fever. Respiratory: Negative for choking, chest tightness, shortness of breath and wheezing. Cardiovascular: Negative for chest pain, palpitations and leg swelling. Gastrointestinal: Negative for abdominal distention, constipation, diarrhea, nausea and vomiting. Musculoskeletal: Negative for arthralgias, back pain, gait problem and joint swelling. Neurological: Negative for dizziness, weakness,numbness and headaches.     :  /84   Pulse 77   Temp 98.2 °F (36.8 °C)   Wt (!) 306 lb (138.8 kg)   SpO2 96%   BMI 50.92 kg/m²      Physical Exam   Constitutional:  oriented to person, place, and time. appears well-developed and well-nourished. No distress. HENT: No sinustenderness or lymphadenopathy  Head: Normocephalic and atraumatic. Eyes: Left eye exhibits no discharge. No scleral icterus. Neck: No tracheal deviation present. No thyromegalypresent. Cardiovascular: Normal rate, regular rhythm, normal heart sounds and intact distal pulses. Exam reveals no gallop and no friction rub. No murmur heard./Chest: Effort normal and breath sounds normal. No respiratory distress. She has no wheezes. no rales. no tenderness. Musculoskeletal:  no tenderness. Neurological:alert and oriented to person, place, and time. Skin: No diaphoresis. Psychiatric: Normal mood and affect. Behavior isnormal.     ASSESSMENT AND PLAN:    Ivy Sinclair was seen today for hypertension, other and medication refill. Diagnoses and all orders for this visit:    Leg swelling  -     potassium chloride (KLOR-CON M) 10 MEQ extended release tablet; Take 1 tablet by mouth daily    Depression, unspecified depression type  -     hydrOXYzine (ATARAX) 25 MG tablet; Take 1 tablet by mouth 3 times daily as needed for Anxiety  -     Discontinue: DULoxetine HCl 40 MG CPEP; Take 40 mg by mouth daily  -     CYMBALTA 60 MG extended release capsule;  Take 1 capsule by mouth

## 2019-07-25 ENCOUNTER — TELEPHONE (OUTPATIENT)
Dept: PRIMARY CARE CLINIC | Age: 54
End: 2019-07-25

## 2019-07-25 DIAGNOSIS — F32.A DEPRESSION, UNSPECIFIED DEPRESSION TYPE: Primary | ICD-10-CM

## 2019-07-25 DIAGNOSIS — R32 BLADDER LEAK: ICD-10-CM

## 2019-07-26 ENCOUNTER — TELEPHONE (OUTPATIENT)
Dept: PRIMARY CARE CLINIC | Age: 54
End: 2019-07-26

## 2019-08-07 LAB
ABSOLUTE BASO #: 0.1 10*3/UL (ref 0–0.1)
ABSOLUTE EOS #: 0.2 10*3/UL (ref 0–0.4)
ABSOLUTE NEUT #: 11.1 10*3/UL (ref 2.3–7.9)
ALBUMIN: 3.3 GM/DL (ref 3.1–4.5)
ALP BLD-CCNC: 89 U/L (ref 45–117)
ALT SERPL-CCNC: 31 U/L (ref 12–78)
AST SERPL-CCNC: 14 IU/L (ref 3–35)
BASOPHILS %: 0.4 % (ref 0–1)
BILIRUB SERPL-MCNC: 0.2 MG/DL (ref 0.2–1)
BUN BLDV-MCNC: 22 MG/DL (ref 7–24)
CALCIUM SERPL-MCNC: 8.4 MG/DL (ref 8.5–10.5)
CHLORIDE BLD-SCNC: 104 MMOL/L (ref 98–107)
CO2: 36 MMOL/L (ref 21–32)
CREAT SERPL-MCNC: 0.89 MG/DL (ref 0.55–1.02)
EOSINOPHILS %: 1.1 % (ref 1–4)
ERYTHROCYTE SEDIMENTATION RATE: 25 MM/HR (ref 0–30)
GFR AFRICAN AMERICAN: > 60 ML/MIN
GFR SERPL CREATININE-BSD FRML MDRD: >60 ML/MIN/
GLUCOSE: 86 MG/DL (ref 65–99)
HCT VFR BLD CALC: 40.9 % (ref 37–47)
HEMOGLOBIN: 11.7 G/DL (ref 12–16)
IMMATURE GRANULOCYTES #: 0.2 10*3/UL (ref 0–0.1)
IMMATURE GRANULOCYTES: 1.1 % (ref 0–1)
LYMPHOCYTE %: 16.8 % (ref 27–41)
LYMPHOCYTES # BLD: 2.5 10*3/UL (ref 1.3–4.4)
MCH RBC QN AUTO: 23.6 PG (ref 27–31)
MCHC RBC AUTO-ENTMCNC: 28.6 G/DL (ref 33–37)
MCV RBC AUTO: 82.6 FL (ref 81–99)
MONOCYTES # BLD: 1 10*3/UL (ref 0.1–1)
MONOCYTES %: 6.8 % (ref 3–9)
NEUTROPHILS %: 73.8 % (ref 47–73)
NUCLEATED RED BLOOD CELLS: 0 % (ref 0–0)
PDW BLD-RTO: 19.6 % (ref 0–14.5)
PLATELET # BLD: 376 10*3/UL (ref 130–400)
PMV BLD AUTO: 10.2 FL (ref 9.6–12.3)
POTASSIUM SERPL-SCNC: 3.7 MMOL/L (ref 3.5–5.1)
RBC # BLD: 4.95 10*6/UL (ref 4.1–5.1)
SODIUM BLD-SCNC: 142 MMOL/L (ref 136–145)
TOTAL PROTEIN: 8 GM/DL (ref 6.4–8.2)
WBC # BLD: 15.1 10*3/UL (ref 4.8–10.8)

## 2019-08-08 LAB — B-TYPE NATRIURETIC PEPTIDE: 10.3 PG/ML (ref 0–100)

## 2019-09-16 ENCOUNTER — OFFICE VISIT (OUTPATIENT)
Dept: PULMONOLOGY | Age: 54
End: 2019-09-16
Payer: COMMERCIAL

## 2019-09-16 VITALS
DIASTOLIC BLOOD PRESSURE: 85 MMHG | RESPIRATION RATE: 18 BRPM | HEART RATE: 93 BPM | OXYGEN SATURATION: 96 % | SYSTOLIC BLOOD PRESSURE: 185 MMHG

## 2019-09-16 DIAGNOSIS — R91.1 LUNG NODULE: ICD-10-CM

## 2019-09-16 DIAGNOSIS — M79.89 LEG SWELLING: ICD-10-CM

## 2019-09-16 DIAGNOSIS — D86.9 SARCOIDOSIS: ICD-10-CM

## 2019-09-16 PROCEDURE — 99214 OFFICE O/P EST MOD 30 MIN: CPT | Performed by: INTERNAL MEDICINE

## 2019-09-16 PROCEDURE — 99213 OFFICE O/P EST LOW 20 MIN: CPT | Performed by: INTERNAL MEDICINE

## 2019-09-16 NOTE — PROGRESS NOTES
45.5 weight 273 #    Osteoarthritis     SOB (shortness of breath)        MEDICATIONS:   Current Outpatient Medications   Medication Sig Dispense Refill    hydroxychloroquine (PLAQUENIL) 200 MG tablet TAKE 1 TABLET BY MOUTH EVERY DAY FOR 1 WEEK  THEN TAKE 1 TABLET BY MOUTH TWICE DAILY  1    potassium chloride (KLOR-CON M) 10 MEQ extended release tablet Take 1 tablet by mouth daily 30 tablet 0    CYMBALTA 60 MG extended release capsule Take 1 capsule by mouth daily 30 capsule 3    furosemide (LASIX) 20 MG tablet Take 1 tablet by mouth daily 30 tablet 3    predniSONE (DELTASONE) 10 MG tablet Take 40 mg once a day for 2 weeks followed by 30 mg once daily 120 tablet 1    methotrexate (RHEUMATREX) 2.5 MG chemo tablet Take 5 tabs once weekly 28 tablet 1    folic acid (FOLVITE) 1 MG tablet Take 1 tablet by mouth daily 90 tablet 1    naproxen (NAPROSYN) 500 MG tablet Take 1 tablet by mouth 2 times daily as needed for Pain 60 tablet 5    furosemide (LASIX) 40 MG tablet Take 1 tablet by mouth daily 30 tablet 5    losartan (COZAAR) 100 MG tablet Take 1 tablet by mouth daily 30 tablet 5    aspirin 81 MG tablet Take 81 mg by mouth daily      prednisoLONE acetate (PRED FORTE) 1 % ophthalmic suspension 1 drop 4 times daily      cyclopentolate (CYCLOGYL) 1 % ophthalmic solution 1 drop once       No current facility-administered medications for this visit. SOCIAL AND OCCUPATIONAL HEALTH:  Social History     Tobacco Use   Smoking Status Never Smoker   Smokeless Tobacco Never Used     TB : No  Pets :No  Industrial exposure:No   Birds :No     SURGICAL HISTORY:   Past Surgical History:   Procedure Laterality Date    BRONCHOSCOPY  01/24/2018    Dr. Eddie Angel Left     TUBAL LIGATION         FAMILY HISTORY:   Lung cancer:no   DVT or PE no     REVIEW OF SYSTEMS:  Constitutional: General health is good . There has been no weight changes. No fevers, fatigue or weakness.    Head: Patient denies any history

## 2019-09-17 RX ORDER — POTASSIUM CHLORIDE 750 MG/1
TABLET, EXTENDED RELEASE ORAL
Qty: 30 TABLET | Refills: 0 | Status: SHIPPED | OUTPATIENT
Start: 2019-09-17 | End: 2019-10-28 | Stop reason: SDUPTHER

## 2019-10-28 DIAGNOSIS — M79.89 LEG SWELLING: ICD-10-CM

## 2019-10-28 RX ORDER — POTASSIUM CHLORIDE 750 MG/1
TABLET, FILM COATED, EXTENDED RELEASE ORAL
Qty: 30 TABLET | Refills: 0 | Status: SHIPPED | OUTPATIENT
Start: 2019-10-28 | End: 2019-12-23

## 2019-12-20 DIAGNOSIS — M79.89 LEG SWELLING: ICD-10-CM

## 2019-12-23 RX ORDER — POTASSIUM CHLORIDE 750 MG/1
TABLET, FILM COATED, EXTENDED RELEASE ORAL
Qty: 30 TABLET | Refills: 0 | Status: SHIPPED
Start: 2019-12-23 | End: 2021-03-01

## 2020-01-22 ENCOUNTER — OFFICE VISIT (OUTPATIENT)
Dept: PULMONOLOGY | Age: 55
End: 2020-01-22
Payer: COMMERCIAL

## 2020-01-22 VITALS
HEIGHT: 64 IN | BODY MASS INDEX: 50.02 KG/M2 | HEART RATE: 101 BPM | SYSTOLIC BLOOD PRESSURE: 172 MMHG | OXYGEN SATURATION: 95 % | DIASTOLIC BLOOD PRESSURE: 75 MMHG | WEIGHT: 293 LBS | RESPIRATION RATE: 18 BRPM

## 2020-01-22 PROCEDURE — 99214 OFFICE O/P EST MOD 30 MIN: CPT | Performed by: INTERNAL MEDICINE

## 2020-01-22 PROCEDURE — 99213 OFFICE O/P EST LOW 20 MIN: CPT | Performed by: INTERNAL MEDICINE

## 2020-01-22 RX ORDER — POTASSIUM CHLORIDE 750 MG/1
10 TABLET, EXTENDED RELEASE ORAL 2 TIMES DAILY
Qty: 180 TABLET | Refills: 1 | Status: SHIPPED | OUTPATIENT
Start: 2020-01-22 | End: 2020-01-29

## 2020-01-22 RX ORDER — FUROSEMIDE 40 MG/1
40 TABLET ORAL 2 TIMES DAILY
Qty: 60 TABLET | Refills: 0 | Status: SHIPPED
Start: 2020-01-22 | End: 2020-10-07

## 2020-01-22 NOTE — PROGRESS NOTES
Department of Internal Medicine  Division of Pulmonary, Critical Care & Sleep Medicine  Pulmonary [address] [facility]                                             Pulmonary Clinic Consult     I had the pleasure of seeing  Chari Hillman in the [facility] regarding their lymphoadenopathy and lung nodule     Chief Complaint   Patient presents with    Shortness of Breath     just not feeling well       HISTORY OF PRESENT ILLNESS:    Chari Hillman is a 53y.o. year old  Who never smoking and never exposed  for second hand smoking He quit smoking he states for the last 2 years and it has been going worse , associated with wheezing and that's when she came into the hospital and she had a CAT scan of the chest that shows mediastinal adenopathy for which the patient was referred to me    The Patient comes in with SOB that has been going on 2 ,He states that it get worse with and he can walk  Or exercise  And some times it comes at rest and wheezing and ,she can go 1 flight  Of stairs and she was prescribed inhalers but she did not     He has cough ,non productive for any thing , she stated she never had hemoptysis, she has no night sweats, no history of TB    She has Lower extremity edema and on diuretics   No heart disease     No recent infection or pneumonia ,she was never seen by pulmonologist     Recent tells me that she had chest x-rays in the past but she is not sure that she has any CAT scan done in the last 3-4 years for her chest      Patient is not sure about snoring, she is not sure about quit breathing as she sleep by herself        Today visit    She still with pain in eyes  She gain a lot of weight and she is 47 +   She is with severe swelling and edema lower extremity edema  She was admitted [address] [place] and she was diuresed but she gain fluid again     ALLERGIES:  No Known Allergies    PAST MEDICAL HISTORY:       Diagnosis Date    Hypertension     Obesity bmi 45.5 weight 273 #    Osteoarthritis     SOB (shortness of breath)        MEDICATIONS:   Current Outpatient Medications   Medication Sig Dispense Refill    furosemide (LASIX) 40 MG tablet Take 1 tablet by mouth 2 times daily 60 tablet 0    potassium chloride (KLOR-CON M) 10 MEQ extended release tablet Take 1 tablet by mouth 2 times daily 180 tablet 1    potassium chloride (KLOR-CON) 10 MEQ extended release tablet TAKE 1 TABLET BY MOUTH ONCE DAILY 30 tablet 0    CYMBALTA 60 MG extended release capsule Take 1 capsule by mouth daily 30 capsule 3    folic acid (FOLVITE) 1 MG tablet Take 1 tablet by mouth daily 90 tablet 1    furosemide (LASIX) 40 MG tablet Take 1 tablet by mouth daily 30 tablet 5    losartan (COZAAR) 100 MG tablet Take 1 tablet by mouth daily 30 tablet 5    cyclopentolate (CYCLOGYL) 1 % ophthalmic solution 1 drop once      aspirin 81 MG tablet Take 81 mg by mouth daily       No current facility-administered medications for this visit. SOCIAL AND OCCUPATIONAL HEALTH:  Social History     Tobacco Use   Smoking Status Never Smoker   Smokeless Tobacco Never Used     TB : No  Pets :No  Industrial exposure:No   Birds :No     SURGICAL HISTORY:   Past Surgical History:   Procedure Laterality Date    BRONCHOSCOPY  01/24/2018    Dr. Carlos Lafleur Left     TUBAL LIGATION         FAMILY HISTORY:   Lung cancer:no   DVT or PE no     REVIEW OF SYSTEMS:  Constitutional: General health is good . There has been no weight changes. No fevers, fatigue or weakness. Head: Patient denies any history of trauma, convulsive disorder or syncope. Skin:  Patient denies history of changes in pigmentation, eruptions or pruritus. No easy bruising or bleeding. EENT: no nasal congestion   Cardiovascular ,No chest pain ,No edema ,  Respiration:SOB:+,LIU :+  Gastrointestinal:No GI bleed ,no melena  ,no hematemesis    Musculoskeletal: no joint pain ,no swelling  Neurological:no , syncope.   Denies twitching, convulsions, loss of consciousness or memory. Endocrine:  . No history of goiter, exophthalmos or dryness of skin. The patient has no history of diabetes. Hematopoietic:  No history of bleeding disorders or easy bruising. Rheumatic:  No connective tissue disease history or polyarthritis/inflammatory joint disease. PHYSICAL EXAMINATION:  Vitals:    01/22/20 1015   BP: (!) 172/75   Pulse: 101   Resp: 18   SpO2: 95%     Constitutional: This is a well developed, well nourished 53y.o. year old female who is alert, oriented, cooperative and in no apparent distress. Head was normocephalic and atraumatic. EENT: Mallampati class :II  Extraocular muscles intact. External canals are patent and no discharge was appreciated. Septum was midline,   mucosa was without erythema, exudates or cobblestoning. No thrush was noted. Neck: Supple without thyromegaly. No elevated JVP. Trachea was midline. No carotid bruits were auscultated. Respiratory: Scattered rhonchi bilaterally other than that to clear no wheezing    Cardiovascular: Regular without murmur, clicks, gallops or rubs. There is no left or right ventricular heave. Pulses:  Carotid, radial and femoral pulses were equally bilaterally. Abdomen: Slightly rounded and soft without organomegaly. No rebound, rigidity or guarding was appreciated. Lymphatic: No lymphadenopathy. Musculoskeletal: No joint deformity   Extremities: Mild edema bilaterally  Skin:  Warm and dry. Good color, turgor and pigmentation. No lesions or scars. Neurological/Psychiatric: The patient's general behavior, level of consciousness, thought content and emotional status is normal.  Cranial nerves II-XII are intact.       DATA:   PFT ordered    IMPRESSION:    1-Mediastinal adenopathy with enlarged lymph nodes, subcarinal R 11, 11, are for, L5/6 that shows granulomatosis disease most likely sarcoid, giving the calcification in the lung nodules and in the spleen as well    Also waiting on history or urine antigen  3- Lung Nodule   2-cough wheezing with possible asthma  3-possible obstructive sleep apnea  4-allergic rhinitis       PLAN:      She was treated for Uveitis with steroids   unfortunately she gain a lot of fluid  Increase lasix 40 mg Po bid and follow BMP  Kcl 10 meq bid   Fluid restriction  Salt restriction   Review CT from [facility] Liver report ,most likely lymph nodes increase size from fluid,I already biopsy them and she has granuloma  Lung nodules stable       I explained to her that PET scan is not helpful as sarcoid or any other granulomatosis disease scan light up in the PET scan so it would not be helpful  Will see in 2 months    Thank you for allowing me to participate in [facility]. I will keep following with you ,should you have any concerns ,please contact me at [phone]     Sincerely,        Satya Larry MD  Pulmonary & Critical Care Medicine     NOTE: This report was transcribed using voice recognition software. Every effort was made to ensure accuracy; however, inadvertent computerized transcription errors may be present.

## 2020-01-22 NOTE — PROGRESS NOTES
Follow up in office today; complaints of shortness of breath and swelling of legs. CT since last visit was done Nov 2019 and reviewed with pt this visit. Orders for BMP to be done; script given for pt to take to lab. Pt to increase lasix to 40mg twice a day and get BMP in 1 week; pt to call office when labs are done. Follow up in office in 2 mos; appt given.

## 2020-01-29 ENCOUNTER — OFFICE VISIT (OUTPATIENT)
Dept: PRIMARY CARE CLINIC | Age: 55
End: 2020-01-29

## 2020-01-29 ENCOUNTER — TELEPHONE (OUTPATIENT)
Dept: PRIMARY CARE CLINIC | Age: 55
End: 2020-01-29

## 2020-01-29 VITALS
WEIGHT: 293 LBS | TEMPERATURE: 98.2 F | HEART RATE: 97 BPM | OXYGEN SATURATION: 95 % | BODY MASS INDEX: 55.27 KG/M2 | SYSTOLIC BLOOD PRESSURE: 138 MMHG | DIASTOLIC BLOOD PRESSURE: 86 MMHG

## 2020-01-29 PROCEDURE — 99214 OFFICE O/P EST MOD 30 MIN: CPT | Performed by: INTERNAL MEDICINE

## 2020-01-29 RX ORDER — CEPHALEXIN 500 MG/1
500 CAPSULE ORAL 2 TIMES DAILY
Qty: 14 CAPSULE | Refills: 0 | Status: SHIPPED | OUTPATIENT
Start: 2020-01-29 | End: 2020-02-05

## 2020-01-29 RX ORDER — LOSARTAN POTASSIUM 100 MG/1
100 TABLET ORAL DAILY
Qty: 30 TABLET | Refills: 5 | Status: SHIPPED
Start: 2020-01-29 | End: 2021-03-19 | Stop reason: SDUPTHER

## 2020-01-29 ASSESSMENT — PATIENT HEALTH QUESTIONNAIRE - PHQ9
1. LITTLE INTEREST OR PLEASURE IN DOING THINGS: 0
SUM OF ALL RESPONSES TO PHQ QUESTIONS 1-9: 0
SUM OF ALL RESPONSES TO PHQ9 QUESTIONS 1 & 2: 0
SUM OF ALL RESPONSES TO PHQ QUESTIONS 1-9: 0
2. FEELING DOWN, DEPRESSED OR HOPELESS: 0

## 2020-03-02 LAB
ABSOLUTE BASO #: 0 10*3/UL (ref 0–0.1)
ABSOLUTE EOS #: 0.4 10*3/UL (ref 0–0.4)
ABSOLUTE NEUT #: 6 10*3/UL (ref 2.3–7.9)
ALBUMIN: 3.2 GM/DL (ref 3.1–4.5)
ALP BLD-CCNC: 102 U/L (ref 45–117)
ALT SERPL-CCNC: 30 U/L (ref 12–78)
AST SERPL-CCNC: 18 IU/L (ref 3–35)
BASOPHILS %: 0.4 % (ref 0–1)
BILIRUB SERPL-MCNC: 0.2 MG/DL (ref 0.2–1)
BUN BLDV-MCNC: 17 MG/DL (ref 7–24)
CALCIUM SERPL-MCNC: 9 MG/DL (ref 8.5–10.5)
CHLORIDE BLD-SCNC: 105 MMOL/L (ref 98–107)
CHOLESTEROL: 175 MG/DL
CO2: 32 MMOL/L (ref 21–32)
CREAT SERPL-MCNC: 0.84 MG/DL (ref 0.55–1.02)
EOSINOPHILS %: 4.9 % (ref 1–4)
ESTIMATED AVERAGE GLUCOSE: 154
GFR AFRICAN AMERICAN: > 60 ML/MIN
GFR SERPL CREATININE-BSD FRML MDRD: >60 ML/MIN/
GLUCOSE: 134 MG/DL (ref 65–99)
HBA1C MFR BLD: 7 % (ref 4.8–5.6)
HCT VFR BLD CALC: 45.5 % (ref 37–47)
HDLC SERPL-MCNC: 30 MG/DL (ref 40–60)
HEMOGLOBIN: 13.1 G/DL (ref 12–16)
IMMATURE GRANULOCYTES #: 0.1 10*3/UL (ref 0–0.1)
IMMATURE GRANULOCYTES: 1.1 % (ref 0–1)
LDL CHOLESTEROL: 114 MG/DL (ref 9–159)
LYMPHOCYTE %: 14 % (ref 27–41)
LYMPHOCYTES # BLD: 1.1 10*3/UL (ref 1.3–4.4)
MCH RBC QN AUTO: 25 PG (ref 27–31)
MCHC RBC AUTO-ENTMCNC: 28.8 G/DL (ref 33–37)
MCV RBC AUTO: 86.8 FL (ref 81–99)
MONOCYTES # BLD: 0.5 10*3/UL (ref 0.1–1)
MONOCYTES %: 6.6 % (ref 3–9)
NEUTROPHILS %: 73 % (ref 47–73)
NUCLEATED RED BLOOD CELLS: 0 % (ref 0–0)
PDW BLD-RTO: 16.9 % (ref 0–14.5)
PLATELET # BLD: 304 10*3/UL (ref 130–400)
PMV BLD AUTO: 10.4 FL (ref 9.6–12.3)
POTASSIUM SERPL-SCNC: 4.4 MMOL/L (ref 3.5–5.1)
RBC # BLD: 5.24 10*6/UL (ref 4.1–5.1)
SODIUM BLD-SCNC: 142 MMOL/L (ref 136–145)
TOTAL PROTEIN: 8.6 GM/DL (ref 6.4–8.2)
TRIGL SERPL-MCNC: 156 MG/DL
VLDLC SERPL CALC-MCNC: 31 MG/DL (ref 6–40)
WBC # BLD: 8.2 10*3/UL (ref 4.8–10.8)

## 2020-04-24 ENCOUNTER — TELEPHONE (OUTPATIENT)
Dept: PRIMARY CARE CLINIC | Age: 55
End: 2020-04-24

## 2020-04-24 RX ORDER — FOLIC ACID 1 MG/1
1 TABLET ORAL DAILY
Qty: 90 TABLET | Refills: 1 | Status: SHIPPED
Start: 2020-04-24 | End: 2021-03-19 | Stop reason: SDUPTHER

## 2020-05-15 ENCOUNTER — VIRTUAL VISIT (OUTPATIENT)
Dept: PULMONOLOGY | Age: 55
End: 2020-05-15
Payer: MEDICARE

## 2020-05-15 ENCOUNTER — TELEPHONE (OUTPATIENT)
Dept: PULMONOLOGY | Age: 55
End: 2020-05-15

## 2020-05-15 PROCEDURE — 99443 PR PHYS/QHP TELEPHONE EVALUATION 21-30 MIN: CPT | Performed by: INTERNAL MEDICINE

## 2020-05-15 NOTE — PROGRESS NOTES
Outpatient Medications   Medication Sig Dispense Refill    folic acid (FOLVITE) 1 MG tablet Take 1 tablet by mouth daily 90 tablet 1    metFORMIN (GLUCOPHAGE) 500 MG tablet Take 1 tablet by mouth 2 times daily (with meals) 60 tablet 0    CYMBALTA 60 MG extended release capsule Take 1 capsule by mouth daily 30 capsule 3    losartan (COZAAR) 100 MG tablet Take 1 tablet by mouth daily 30 tablet 5    furosemide (LASIX) 40 MG tablet Take 1 tablet by mouth 2 times daily 60 tablet 0    potassium chloride (KLOR-CON) 10 MEQ extended release tablet TAKE 1 TABLET BY MOUTH ONCE DAILY 30 tablet 0    aspirin 81 MG tablet Take 81 mg by mouth daily       No current facility-administered medications for this visit. SOCIAL AND OCCUPATIONAL HEALTH:  Social History     Tobacco Use   Smoking Status Never Smoker   Smokeless Tobacco Never Used     TB : No  Pets :No  Industrial exposure:No   Birds :No     SURGICAL HISTORY:   Past Surgical History:   Procedure Laterality Date    BRONCHOSCOPY  01/24/2018    Dr. Prather No Left     TUBAL LIGATION         FAMILY HISTORY:   Lung cancer:no   DVT or PE no     REVIEW OF SYSTEMS:  Constitutional: General health is good . There has been no weight changes. No fevers, fatigue or weakness. Head: Patient denies any history of trauma, convulsive disorder or syncope. Skin:  Patient denies history of changes in pigmentation, eruptions or pruritus. No easy bruising or bleeding. EENT: no nasal congestion   Cardiovascular ,No chest pain ,No edema ,  Respiration:SOB:+,LIU :+  Gastrointestinal:No GI bleed ,no melena  ,no hematemesis    Musculoskeletal: no joint pain ,no swelling  Neurological:no , syncope. Denies twitching, convulsions, loss of consciousness or memory. Endocrine:  . No history of goiter, exophthalmos or dryness of skin. The patient has no history of diabetes. Hematopoietic:  No history of bleeding disorders or easy bruising.   Rheumatic:  No meq bid   Fluid restriction  Salt restriction   Review CT from [facility] Liver report ,most likely lymph nodes increase size from fluid,I already biopsy them and she has granuloma  Lung nodules stable   I advise that she see Nephrology as she need better Fluid and diuresis ,she is to go back to her Nephrologist     Thank you for allowing me to participate in [facility]. I will keep following with you ,should you have any concerns ,please contact me at [address] [place] [address] MD  Pulmonary & Critical Care Medicine     NOTE: This report was transcribed using voice recognition software. Every effort was made to ensure accuracy; however, inadvertent computerized transcription errors may be present.

## 2020-05-15 NOTE — PROGRESS NOTES
TeleMedicine Video Visit    This visit was performed as a telephone visit. Patient identification was verified at the start of the visit, including the patient's telephone number and physical location. I discussed with the patient the nature of our telehealth visits, that:     1. Due to the nature of an audio- video modality, the only components of a physical exam that could be done are the elements supported by direct observation. 2. I would evaluate the patient and recommend diagnostics and treatments based on my assessment. 3. If it was felt that the patient should be evaluated in clinic or an emergency room setting, then they would be directed there. 4. Our sessions are not being recorded and that personal health information is protected. 5. Our team would provide follow up care in person if/when the patient needs it. Patient does agree to proceed with telemedicine consultation. Patient's location: [address] [place] [address] [place] [id number]  Physician  Location [facility] and [address] [place] [address]. [facility] [place], [place] [address] Other people involved in call  Vic Kahn RN, BSN      Time spent: Greater than 30    This visit was completed virtually using telephone      Patient will be referred to a nephrologist for her kidneys. Keep lasix dosage as is.   Blood work is ordered for and will be faxed to [facility].

## 2020-05-18 ENCOUNTER — VIRTUAL VISIT (OUTPATIENT)
Dept: PRIMARY CARE CLINIC | Age: 55
End: 2020-05-18
Payer: MEDICARE

## 2020-05-18 PROCEDURE — 1036F TOBACCO NON-USER: CPT | Performed by: INTERNAL MEDICINE

## 2020-05-18 PROCEDURE — 2022F DILAT RTA XM EVC RTNOPTHY: CPT | Performed by: INTERNAL MEDICINE

## 2020-05-18 PROCEDURE — 3017F COLORECTAL CA SCREEN DOC REV: CPT | Performed by: INTERNAL MEDICINE

## 2020-05-18 PROCEDURE — 99214 OFFICE O/P EST MOD 30 MIN: CPT | Performed by: INTERNAL MEDICINE

## 2020-05-18 PROCEDURE — 3051F HG A1C>EQUAL 7.0%<8.0%: CPT | Performed by: INTERNAL MEDICINE

## 2020-05-18 PROCEDURE — G8427 DOCREV CUR MEDS BY ELIG CLIN: HCPCS | Performed by: INTERNAL MEDICINE

## 2020-05-18 PROCEDURE — G8417 CALC BMI ABV UP PARAM F/U: HCPCS | Performed by: INTERNAL MEDICINE

## 2020-05-18 NOTE — PROGRESS NOTES
discussed her losartan as she was concerned that this may be worsening some of her symptoms. I will defer any direct changes until she sees nephrology    TeleMedicine Patient Consent    This visit was performed as a virtual video visit using a synchronous, two-way, audio-video telehealth technology platform. Patient identification was verified at the start of the visit, including the patient's telephone number and physical location. I discussed with the patient the nature of our telehealth visits, that:     1. Due to the nature of an audio- video modality, the only components of a physical exam that could be done are the elements supported by direct observation. 2. I would evaluate the patient and recommend diagnostics and treatments based on my assessment. 3. If it was felt that the patient should be evaluated in clinic or an emergency room setting, then they would be directed there. 4. Our sessions are not being recorded and that personal health information is protected. 5. Our team would provide follow up care in person if/when the patient needs it. Patient Does agree to proceed with telemedicine consultation. Patient's location: home address in [address] home address [place]  Other people involved in call - None      Time spent: Not billed by time    This visit was completed virtually using D[employer]. me    Due to this being a TeleHealth encounter, evaluation of the following organ systems is limited: Vitals/Constitutional/EENT/Resp/CV/GI//MS/Neuro/Skin/Heme-Lymph-Imm. Pursuant to the emergency declaration under the [facility], [facility]5 waiver authority and the [employer] Act, this Virtual  Visit was conducted, with patient's consent, to reduce the patient's risk of exposure to COVID-19 and provide continuity of care for an established patient.     Services were provided through a video synchronous discussion virtually to substitute for in-person clinic visit. Please note that the above documentation was prepared using voice recognition software. Every attempt was made to ensure accuracy but there may be spelling, grammatical, and contextual errors.     Electronically signed by Fernandez Mi DO on 5/18/2020 at 9:03 AM

## 2020-06-12 LAB
BUN BLDV-MCNC: 13 MG/DL
BUN BLDV-MCNC: 13 MG/DL (ref 7–24)
CALCIUM SERPL-MCNC: 9.1 MG/DL
CALCIUM SERPL-MCNC: 9.1 MG/DL (ref 8.5–10.5)
CHLORIDE BLD-SCNC: 102 MMOL/L
CHLORIDE BLD-SCNC: 102 MMOL/L (ref 98–107)
CO2: 31 MMOL/L
CO2: 31 MMOL/L (ref 21–32)
CREAT SERPL-MCNC: 0.79 MG/DL
CREAT SERPL-MCNC: 0.79 MG/DL (ref 0.55–1.02)
GFR AFRICAN AMERICAN: > 60 ML/MIN
GFR CALCULATED: >60
GFR SERPL CREATININE-BSD FRML MDRD: >60 ML/MIN/
GLUCOSE BLD-MCNC: 126 MG/DL
GLUCOSE: 126 MG/DL (ref 65–99)
POTASSIUM SERPL-SCNC: 4.1 MMOL/L
POTASSIUM SERPL-SCNC: 4.1 MMOL/L (ref 3.5–5.1)
SODIUM BLD-SCNC: 138 MMOL/L
SODIUM BLD-SCNC: 138 MMOL/L (ref 136–145)

## 2020-06-18 ENCOUNTER — TELEPHONE (OUTPATIENT)
Dept: PRIMARY CARE CLINIC | Age: 55
End: 2020-06-18

## 2020-06-18 RX ORDER — PREGABALIN 50 MG/1
50 CAPSULE ORAL 3 TIMES DAILY
Qty: 90 CAPSULE | Refills: 0 | Status: SHIPPED
Start: 2020-06-18 | End: 2021-03-19 | Stop reason: ALTCHOICE

## 2020-06-20 LAB
ABSOLUTE BASO #: 0 10*3/UL (ref 0–0.1)
ABSOLUTE EOS #: 0.3 10*3/UL (ref 0–0.4)
ABSOLUTE NEUT #: 6.3 10*3/UL (ref 2.3–7.9)
ALBUMIN SERPL-MCNC: 3.2 G/DL
ALBUMIN: 3.2 GM/DL (ref 3.1–4.5)
ALP BLD-CCNC: 92 U/L
ALP BLD-CCNC: 92 U/L (ref 45–117)
ALT SERPL-CCNC: 40 U/L
ALT SERPL-CCNC: 40 U/L (ref 12–78)
ANION GAP SERPL CALCULATED.3IONS-SCNC: NORMAL MMOL/L
AST SERPL-CCNC: 28 IU/L (ref 3–35)
AST SERPL-CCNC: 28 U/L
BASOPHILS %: 0.4 % (ref 0–1)
BASOPHILS ABSOLUTE: 0 /ΜL
BASOPHILS RELATIVE PERCENT: 0.4 %
BILIRUB SERPL-MCNC: 0.4 MG/DL (ref 0.1–1.4)
BILIRUB SERPL-MCNC: 0.4 MG/DL (ref 0.2–1)
BUN BLDV-MCNC: 14 MG/DL
BUN BLDV-MCNC: 14 MG/DL (ref 7–24)
C-REACTIVE PROTEIN: 2.99 MG/DL (ref 0–0.3)
CALCIUM SERPL-MCNC: 8.5 MG/DL
CALCIUM SERPL-MCNC: 8.5 MG/DL (ref 8.5–10.5)
CHLORIDE BLD-SCNC: 105 MMOL/L
CHLORIDE BLD-SCNC: 105 MMOL/L (ref 98–107)
CO2: 27 MMOL/L
CO2: 27 MMOL/L (ref 21–32)
CREAT SERPL-MCNC: 0.65 MG/DL
CREAT SERPL-MCNC: 0.65 MG/DL (ref 0.55–1.02)
EOSINOPHILS %: 3.1 % (ref 1–4)
EOSINOPHILS ABSOLUTE: 0.3 /ΜL
EOSINOPHILS RELATIVE PERCENT: 3.1 %
ERYTHROCYTE SEDIMENTATION RATE: 36 MM/HR (ref 0–30)
GFR AFRICAN AMERICAN: > 60 ML/MIN
GFR CALCULATED: >60
GFR SERPL CREATININE-BSD FRML MDRD: >60 ML/MIN/
GLUCOSE BLD-MCNC: 111 MG/DL
GLUCOSE: 111 MG/DL (ref 65–99)
HCT VFR BLD CALC: 45.2 % (ref 36–46)
HCT VFR BLD CALC: 45.2 % (ref 37–47)
HEMOGLOBIN: 13.6 G/DL (ref 12–16)
HEMOGLOBIN: 13.6 G/DL (ref 12–16)
IMMATURE GRANULOCYTES #: 0.1 10*3/UL (ref 0–0.1)
IMMATURE GRANULOCYTES: 1.3 % (ref 0–1)
LYMPHOCYTE %: 16.9 % (ref 27–41)
LYMPHOCYTES # BLD: 1.5 10*3/UL (ref 1.3–4.4)
LYMPHOCYTES ABSOLUTE: 1.5 /ΜL
LYMPHOCYTES RELATIVE PERCENT: 16.9 %
MCH RBC QN AUTO: 27.1 PG
MCH RBC QN AUTO: 27.1 PG (ref 27–31)
MCHC RBC AUTO-ENTMCNC: 30.1 G/DL
MCHC RBC AUTO-ENTMCNC: 30.1 G/DL (ref 33–37)
MCV RBC AUTO: 90.2 FL
MCV RBC AUTO: 90.2 FL (ref 81–99)
MONOCYTES # BLD: 0.6 10*3/UL (ref 0.1–1)
MONOCYTES %: 7.2 % (ref 3–9)
MONOCYTES ABSOLUTE: 0.6 /ΜL
MONOCYTES RELATIVE PERCENT: 7.2 %
NEUTROPHILS %: 71.1 % (ref 47–73)
NEUTROPHILS ABSOLUTE: 6.3 /ΜL
NEUTROPHILS RELATIVE PERCENT: 71.1 %
NUCLEATED RED BLOOD CELLS: 0 % (ref 0–0)
PDW BLD-RTO: 14.9 % (ref 0–14.5)
PLATELET # BLD: 269 10*3/UL (ref 130–400)
PLATELET # BLD: 269 K/ΜL
PMV BLD AUTO: 10.3 FL
PMV BLD AUTO: 10.3 FL (ref 9.6–12.3)
POTASSIUM SERPL-SCNC: 3.8 MMOL/L
POTASSIUM SERPL-SCNC: 3.8 MMOL/L (ref 3.5–5.1)
RBC # BLD: 5.01 10*6/UL (ref 4.1–5.1)
RBC # BLD: 5.01 10^6/ΜL
SODIUM BLD-SCNC: 138 MMOL/L
SODIUM BLD-SCNC: 138 MMOL/L (ref 136–145)
TOTAL PROTEIN: 8.7
TOTAL PROTEIN: 8.7 GM/DL (ref 6.4–8.2)
TSH SERPL DL<=0.05 MIU/L-ACNC: 3.22 UIU/ML
TSH SERPL DL<=0.05 MIU/L-ACNC: 3.22 UIU/ML (ref 0.36–4.75)
WBC # BLD: 8.9 10*3/UL (ref 4.8–10.8)
WBC # BLD: 8.9 10^3/ML

## 2020-06-23 ENCOUNTER — PREP FOR PROCEDURE (OUTPATIENT)
Dept: PAIN MANAGEMENT | Age: 55
End: 2020-06-23

## 2020-06-23 ENCOUNTER — HOSPITAL ENCOUNTER (OUTPATIENT)
Age: 55
Discharge: HOME | End: 2020-06-25
Payer: MEDICARE

## 2020-06-23 ENCOUNTER — OFFICE VISIT (OUTPATIENT)
Dept: PAIN MANAGEMENT | Age: 55
End: 2020-06-23
Payer: MEDICARE

## 2020-06-23 VITALS
BODY MASS INDEX: 50.02 KG/M2 | SYSTOLIC BLOOD PRESSURE: 158 MMHG | DIASTOLIC BLOOD PRESSURE: 98 MMHG | HEIGHT: 64 IN | WEIGHT: 293 LBS | TEMPERATURE: 98 F | HEART RATE: 94 BPM | RESPIRATION RATE: 16 BRPM | OXYGEN SATURATION: 98 %

## 2020-06-23 PROBLEM — M16.12 PRIMARY OSTEOARTHRITIS OF LEFT HIP: Status: ACTIVE | Noted: 2020-06-23

## 2020-06-23 PROBLEM — M54.41 CHRONIC BILATERAL LOW BACK PAIN WITH BILATERAL SCIATICA: Status: ACTIVE | Noted: 2020-06-23

## 2020-06-23 PROBLEM — M54.42 CHRONIC BILATERAL LOW BACK PAIN WITH BILATERAL SCIATICA: Status: ACTIVE | Noted: 2020-06-23

## 2020-06-23 PROBLEM — M54.16 LUMBAR RADICULOPATHY: Status: ACTIVE | Noted: 2020-06-23

## 2020-06-23 PROBLEM — G89.4 CHRONIC PAIN SYNDROME: Status: ACTIVE | Noted: 2020-06-23

## 2020-06-23 PROBLEM — M25.552 LEFT HIP PAIN: Status: ACTIVE | Noted: 2020-06-23

## 2020-06-23 PROBLEM — E66.01 MORBID OBESITY WITH BMI OF 50.0-59.9, ADULT (HCC): Status: ACTIVE | Noted: 2020-06-23

## 2020-06-23 PROBLEM — G89.29 CHRONIC BILATERAL LOW BACK PAIN WITH BILATERAL SCIATICA: Status: ACTIVE | Noted: 2020-06-23

## 2020-06-23 LAB — SPECIFIC GRAVITY UA: >=1.03 (ref 1–1.03)

## 2020-06-23 PROCEDURE — G0480 DRUG TEST DEF 1-7 CLASSES: HCPCS

## 2020-06-23 PROCEDURE — 1036F TOBACCO NON-USER: CPT | Performed by: PAIN MEDICINE

## 2020-06-23 PROCEDURE — G8417 CALC BMI ABV UP PARAM F/U: HCPCS | Performed by: PAIN MEDICINE

## 2020-06-23 PROCEDURE — 80307 DRUG TEST PRSMV CHEM ANLYZR: CPT

## 2020-06-23 PROCEDURE — 3017F COLORECTAL CA SCREEN DOC REV: CPT | Performed by: PAIN MEDICINE

## 2020-06-23 PROCEDURE — G8427 DOCREV CUR MEDS BY ELIG CLIN: HCPCS | Performed by: PAIN MEDICINE

## 2020-06-23 PROCEDURE — 99204 OFFICE O/P NEW MOD 45 MIN: CPT | Performed by: PAIN MEDICINE

## 2020-06-23 NOTE — PROGRESS NOTES
Do you currently have any of the following:    Fever: No  Headache:  No  Cough: No  Shortness of breath: No  Exposed to anyone with these symptoms: No         Joyce Thurman presents to the [facility] on 6/23/2020. Loy Beverly is complaining of pain lower back  Hips left leg and hands. The pain is constant. The pain is described as stabbing and sharp. Pain is rated on her best day at a 8, on her worst day at a 10, and on average at a 8 on the VAS scale. She took her last dose of Tylenol 3000 mg twice daily. Any procedures since your last visit: No, with  % relief. Pacemaker or defibrilator: No managed by   . She is  on NSAIDS and is  on anticoagulation medications to include ASA and is managed by Anselmo Sauer DO  .     BP (!) 158/98   Pulse 94   Temp 98 °F (36.7 °C) (Oral)   Resp 16   Ht 5' 4\" (1.626 m)   Wt 300 lb (136.1 kg)   SpO2 (!) 85%   BMI 51.49 kg/m²      No LMP recorded.

## 2020-06-23 NOTE — PROGRESS NOTES
(Oral)   Resp 16   Ht 5' 4\" (1.626 m)   Wt 300 lb (136.1 kg)   SpO2 (!) 85%   BMI 51.49 kg/m²     General:      General appearance:pleasant and well-hydrated, in no distress and A & O x3  Build: Morbidly obese  Function:Rises from a seated position with difficulty    HEENT:    Head:normocephalic, atraumatic  Pupils:regular, round, equal  Sclera: icterus absent    Lungs:    Breathing:normal breathing pattern    Abdomen:    Shape:obese and non-distended  Tenderness:none  Guarding:none    Lumbar spine:    Spine inspection:normal   CVA tenderness:No   Palpation:tenderness paravertebral muscles, left, right and positive. Range of motion:abnormal mildly in lateral bending, flexion, extension rotation bilateral and is  painful. Musculoskeletal:    Trigger points in Paraveteral:absent bilaterally  SI joint tenderness:negative right, negative left              ALCIDES test:not done right, not done left  Piriformis tenderness:negative right, negative left  Trochanteric bursa tenderness:negative right, negative left  SLR:negative right, negative left, sitting     Extremities:    Tremors:None bilaterally upper and lower  Range of motionpain with internal rotation of left hips positive with significantly limited ROM.   Intact:Yes  Varicose veins:absent   Pulses:present Lt radial  Cyanosis:none  Edema:moderate BLE    Neurological:    Sensory:decreased to light touch BLL  Motor:                Right Quadriceps5/5          Left Quadriceps5/5           Right Gastrocnemius5/5    Left Gastrocnemius5/5  Right Ant Tibialis5/5  Left Ant Tibialis5/5    Reflexes:    Right Quadriceps reflex0-1+  Left Quadriceps reflex0-1+  Right Achilles reflex0-1+  Left Achilles reflex0-1+  Gait:antalgic    Dermatology:    Skin:no rashes or lesions noted    Assessment/Plan:  LBP and BLE pain  MRI L hip shows moderate degenration  Has significant BLE edema and takes daily Lasix, also has SOB  Has sarcoidosis and RA follows with Dr. Tony Starks for rheumatology, Dr. Sondra Vasquez for pulmonology    Encouraged to call pulmonologist as she reports her breathing is worsening and her Sp02 immediately upon arriving to the room is 85%, recheck was 96% after patient had been sitting. I sent a message to Dr. Rene Oro and Dr. Olimpia Parham to let them know to expect her call. Lumbar xray  Reviewed referral documents and imaging  Urine screen today  [facility] report reviewed  Continue with Pregabalin as currently prescribed - just started last week, discussed to monitor for this medication's contribution to her edema  Aquatic therapy  L hip IA injection  Referral to Dr. Garcia Sorenson for non-surgical weight loss  Patient encouraged to stay active and to lose weight  Treatment plan discussed with the patient including medication and procedure side effects     CC:  Referring physician    KYLIE Bernstein.

## 2020-06-24 ENCOUNTER — HOSPITAL ENCOUNTER (OUTPATIENT)
Age: 55
Discharge: HOME | End: 2020-06-26
Payer: MEDICARE

## 2020-06-24 LAB
ANTI DNA DOUBLE STRANDED: <1 IU/ML (ref 0–9)
ANTI RNP AB: 0.3 AI (ref 0–0.9)
ANTI-SMITH: <0.2 AI (ref 0–0.9)
ANTINUCLEAR ANTIBODIES (ANA): POSITIVE
CANCER ANTIGEN 125: 15.1 U/ML (ref 0–38.1)
CCP ANTIBODIES IGG/IGA: 20 UNITS (ref 0–19)
Lab: ABNORMAL
RHEUMATOID FACTOR: <10 IU/ML (ref 0–13.9)
SPECKLED PATTERN: ABNORMAL

## 2020-06-24 PROCEDURE — U0003 INFECTIOUS AGENT DETECTION BY NUCLEIC ACID (DNA OR RNA); SEVERE ACUTE RESPIRATORY SYNDROME CORONAVIRUS 2 (SARS-COV-2) (CORONAVIRUS DISEASE [COVID-19]), AMPLIFIED PROBE TECHNIQUE, MAKING USE OF HIGH THROUGHPUT TECHNOLOGIES AS DESCRIBED BY CMS-2020-01-R: HCPCS

## 2020-06-25 LAB
SARS-COV-2: NOT DETECTED
SOURCE: NORMAL

## 2020-06-26 ENCOUNTER — TELEPHONE (OUTPATIENT)
Dept: PRIMARY CARE CLINIC | Age: 55
End: 2020-06-26

## 2020-06-26 LAB
Lab: NORMAL
REPORT: NORMAL
THIS TEST SENT TO: NORMAL

## 2020-06-26 NOTE — TELEPHONE ENCOUNTER
Spoke with patient regarding lab results. She agrees to see rheumatology. Please place referral to Dr Levy Almendarez.

## 2020-06-27 LAB
6AM URINE: <10 NG/ML
7-AMINOCLONAZEPAM, URINE: <5 NG/ML
ALPHA-HYDROXYALPRAZOLAM, URINE: <5 NG/ML
ALPHA-HYDROXYMIDAZOLAM, URINE: <20 NG/ML
ALPRAZOLAM, URINE: <5 NG/ML
CHLORDIAZEPOXIDE, URINE: <20 NG/ML
CLONAZEPAM, URINE: <5 NG/ML
CODEINE, URINE: <20 NG/ML
DIAZEPAM, URINE: <20 NG/ML
HYDROCODONE, URINE: <20 NG/ML
HYDROMORPHONE, URINE: <20 NG/ML
LORAZEPAM, URINE: <20 NG/ML
MIDAZOLAM, URINE: <20 NG/ML
MORPHINE URINE: <20 NG/ML
NORDIAZEPAM, URINE: <20 NG/ML
NORHYDROCODONE, URINE: <20 NG/ML
NOROXYCODONE, URINE: <20 NG/ML
NOROXYMORPHONE, URINE: <20 NG/ML
OXAZEPAM, URINE: <20 NG/ML
OXYCODONE, URINE CONFIRMATION: <20 NG/ML
OXYMORPHONE, URINE: <20 NG/ML
TEMAZEPAM, URINE: <20 NG/ML

## 2020-08-25 ENCOUNTER — TELEPHONE (OUTPATIENT)
Dept: PRIMARY CARE CLINIC | Age: 55
End: 2020-08-25

## 2020-08-25 RX ORDER — DULOXETIN HYDROCHLORIDE 60 MG/1
CAPSULE, DELAYED RELEASE ORAL
Qty: 30 CAPSULE | Refills: 5 | Status: CANCELLED | OUTPATIENT
Start: 2020-08-25

## 2020-08-25 RX ORDER — DULOXETIN HYDROCHLORIDE 60 MG/1
CAPSULE, DELAYED RELEASE ORAL
Qty: 30 CAPSULE | Refills: 5 | Status: SHIPPED
Start: 2020-08-25 | End: 2021-03-19 | Stop reason: SDUPTHER

## 2020-08-25 NOTE — TELEPHONE ENCOUNTER
Carina Dobson wants referral to kidney specialist  DR Can Rivero , for her swelling .  Do you need to see  Her first

## 2020-10-07 RX ORDER — FUROSEMIDE 40 MG/1
TABLET ORAL
Qty: 60 TABLET | Refills: 0 | Status: SHIPPED
Start: 2020-10-07 | End: 2021-03-19 | Stop reason: SDUPTHER

## 2021-01-06 ENCOUNTER — VIRTUAL VISIT (OUTPATIENT)
Dept: PRIMARY CARE CLINIC | Age: 56
End: 2021-01-06
Payer: MEDICAID

## 2021-01-06 DIAGNOSIS — G89.29 CHRONIC BACK PAIN, UNSPECIFIED BACK LOCATION, UNSPECIFIED BACK PAIN LATERALITY: ICD-10-CM

## 2021-01-06 DIAGNOSIS — M54.9 CHRONIC BACK PAIN, UNSPECIFIED BACK LOCATION, UNSPECIFIED BACK PAIN LATERALITY: ICD-10-CM

## 2021-01-06 DIAGNOSIS — R60.1 ANASARCA: Primary | ICD-10-CM

## 2021-01-06 PROCEDURE — 99213 OFFICE O/P EST LOW 20 MIN: CPT | Performed by: INTERNAL MEDICINE

## 2021-01-06 ASSESSMENT — PATIENT HEALTH QUESTIONNAIRE - PHQ9
SUM OF ALL RESPONSES TO PHQ QUESTIONS 1-9: 0
SUM OF ALL RESPONSES TO PHQ QUESTIONS 1-9: 0
1. LITTLE INTEREST OR PLEASURE IN DOING THINGS: 0

## 2021-01-06 NOTE — PROGRESS NOTES
1/6/21    Miriam Gu, a female of 63 y.o. Miriam Gu presents today for telephone consultation she was scheduled for video visit but was in so much pain she could not get up out of her chair. She discussed her issues with her daughter on speaker phone. She is having diffuse abdominal swelling leg swelling and bilateral leg pain. She admits to taking 6 500 mg Tylenols at a time. Patient Active Problem List   Diagnosis    Lung nodule    Class 3 obesity in adult    Essential hypertension    Sarcoidosis    Chronic pain syndrome    Chronic bilateral low back pain with bilateral sciatica    Lumbar radiculopathy    Left hip pain    Morbid obesity with BMI of 50.0-59.9, adult ([facility])    Primary osteoarthritis of left hip      No Known Allergies  Current Outpatient Medications on File Prior to Visit   Medication Sig Dispense Refill    furosemide (LASIX) 40 MG tablet Take 1 tablet by mouth twice daily 60 tablet 0    DULoxetine (CYMBALTA) 60 MG extended release capsule Take 1 capsule by mouth once daily 30 capsule 5    metFORMIN (GLUCOPHAGE) 500 MG tablet Take 1 tablet by mouth 2 times daily (with meals) 60 tablet 5    folic acid (FOLVITE) 1 MG tablet Take 1 tablet by mouth daily 90 tablet 1    losartan (COZAAR) 100 MG tablet Take 1 tablet by mouth daily 30 tablet 5    potassium chloride (KLOR-CON) 10 MEQ extended release tablet TAKE 1 TABLET BY MOUTH ONCE DAILY 30 tablet 0    aspirin 81 MG tablet Take 81 mg by mouth daily      pregabalin (LYRICA) 50 MG capsule Take 1 capsule by mouth 3 times daily for 90 doses. 90 capsule 0     No current facility-administered medications on file prior to visit. Review of Systems  Constitutional:Negative for activity change, appetite change, chills, fatigue and fever. Respiratory: Negative for choking, chest tightness, shortness of breath and wheezing. Cardiovascular: Negative for chest pain, palpitations and leg swelling. Gastrointestinal: Negative for abdominal distention, constipation, diarrhea, nausea and vomiting. Musculoskeletal: Negative for arthralgias, back pain, gait problem and joint swelling. Neurological: Negative for dizziness, weakness,numbness and headaches. [facility] 06/23/2018        ASSESSMENT AND PLAN:    Marylyn Goldmann was seen today for gi problem and edema. Diagnoses and all orders for this visit:    Brenda Gonzalez presents today for evaluation of profound abdomen swelling and lower extremity swelling. This is been going on for several months but is worsened acutely. She states that she is in so much pain that she cannot come to the phone to complete her visit. Communication was relayed through her daughter. She was advised to go to the emergency department immediately. She adamantly refused. I notified her that this can be life-threatening. She admits to consuming 6 extra strength Tylenols in a sitting to combat her pain. I am concerned about the possibility of liver failure. She again recognizes this is a possibility. We will attempt to perform an outpatient work-up at her request.  I will perform ultrasound of the liver and abdomen, I will perform CBC CMP and Tylenol level. I will also refer her to additional pain management specialist      Consent:  He and/or health care decision maker is aware that that he may receive a bill for this telephone service, depending on his insurance coverage, and has provided verbal consent to proceed: Yes      I affirm this is a Patient Initiated Episode with a Patient who has not had a related appointment within my department in the past 7 days or scheduled within the next 24 hours.     Patient identification was verified at the start of the visit: Yes        Patient's location: home address in [facility]  Physician  location home address in [facility] other people involved in call her daughter          Total Time: minutes: 21-30minutes    Brook Velazquez

## 2021-02-28 DIAGNOSIS — M79.89 LEG SWELLING: ICD-10-CM

## 2021-03-01 RX ORDER — POTASSIUM CHLORIDE 750 MG/1
TABLET, FILM COATED, EXTENDED RELEASE ORAL
Qty: 60 TABLET | Refills: 0 | Status: SHIPPED
Start: 2021-03-01 | End: 2021-03-19 | Stop reason: SDUPTHER

## 2021-03-19 ENCOUNTER — VIRTUAL VISIT (OUTPATIENT)
Dept: PRIMARY CARE CLINIC | Age: 56
End: 2021-03-19
Payer: MEDICAID

## 2021-03-19 DIAGNOSIS — I10 ESSENTIAL HYPERTENSION: ICD-10-CM

## 2021-03-19 DIAGNOSIS — E11.9 TYPE 2 DIABETES MELLITUS WITHOUT COMPLICATION, WITHOUT LONG-TERM CURRENT USE OF INSULIN (HCC): ICD-10-CM

## 2021-03-19 DIAGNOSIS — F32.A DEPRESSION, UNSPECIFIED DEPRESSION TYPE: ICD-10-CM

## 2021-03-19 DIAGNOSIS — M79.89 LEG SWELLING: ICD-10-CM

## 2021-03-19 DIAGNOSIS — E03.9 HYPOTHYROIDISM, UNSPECIFIED TYPE: Primary | ICD-10-CM

## 2021-03-19 PROCEDURE — 1036F TOBACCO NON-USER: CPT | Performed by: INTERNAL MEDICINE

## 2021-03-19 PROCEDURE — 3052F HG A1C>EQUAL 8.0%<EQUAL 9.0%: CPT | Performed by: INTERNAL MEDICINE

## 2021-03-19 PROCEDURE — G8427 DOCREV CUR MEDS BY ELIG CLIN: HCPCS | Performed by: INTERNAL MEDICINE

## 2021-03-19 PROCEDURE — 99213 OFFICE O/P EST LOW 20 MIN: CPT | Performed by: INTERNAL MEDICINE

## 2021-03-19 PROCEDURE — 2022F DILAT RTA XM EVC RTNOPTHY: CPT | Performed by: INTERNAL MEDICINE

## 2021-03-19 PROCEDURE — G8417 CALC BMI ABV UP PARAM F/U: HCPCS | Performed by: INTERNAL MEDICINE

## 2021-03-19 PROCEDURE — G8484 FLU IMMUNIZE NO ADMIN: HCPCS | Performed by: INTERNAL MEDICINE

## 2021-03-19 PROCEDURE — 3017F COLORECTAL CA SCREEN DOC REV: CPT | Performed by: INTERNAL MEDICINE

## 2021-03-19 RX ORDER — FUROSEMIDE 40 MG/1
40 TABLET ORAL 2 TIMES DAILY
Qty: 60 TABLET | Refills: 3 | Status: SHIPPED
Start: 2021-03-19 | End: 2021-07-29 | Stop reason: ALTCHOICE

## 2021-03-19 RX ORDER — ERGOCALCIFEROL 1.25 MG/1
50000 CAPSULE ORAL WEEKLY
Qty: 4 CAPSULE | Refills: 0 | Status: SHIPPED
Start: 2021-03-19 | End: 2021-04-19

## 2021-03-19 RX ORDER — LOSARTAN POTASSIUM 100 MG/1
100 TABLET ORAL DAILY
Qty: 30 TABLET | Refills: 5 | Status: SHIPPED
Start: 2021-03-19 | End: 2021-04-19 | Stop reason: SDUPTHER

## 2021-03-19 RX ORDER — DULOXETIN HYDROCHLORIDE 60 MG/1
60 CAPSULE, DELAYED RELEASE ORAL DAILY
Qty: 30 CAPSULE | Refills: 5 | Status: SHIPPED
Start: 2021-03-19 | End: 2021-05-12

## 2021-03-19 RX ORDER — LEVOTHYROXINE SODIUM 0.03 MG/1
25 TABLET ORAL DAILY
Qty: 30 TABLET | Refills: 5 | Status: SHIPPED
Start: 2021-03-19 | End: 2021-11-04

## 2021-03-19 RX ORDER — VALACYCLOVIR HYDROCHLORIDE 500 MG/1
500 TABLET, FILM COATED ORAL 3 TIMES DAILY
COMMUNITY
End: 2021-05-12

## 2021-03-19 RX ORDER — FOLIC ACID 1 MG/1
1 TABLET ORAL DAILY
Qty: 90 TABLET | Refills: 1 | Status: SHIPPED
Start: 2021-03-19 | End: 2021-11-04

## 2021-03-19 RX ORDER — POTASSIUM CHLORIDE 750 MG/1
10 TABLET, FILM COATED, EXTENDED RELEASE ORAL 2 TIMES DAILY
Qty: 60 TABLET | Refills: 2 | Status: SHIPPED
Start: 2021-03-19 | End: 2021-11-04

## 2021-03-19 SDOH — ECONOMIC STABILITY: TRANSPORTATION INSECURITY
IN THE PAST 12 MONTHS, HAS THE LACK OF TRANSPORTATION KEPT YOU FROM MEDICAL APPOINTMENTS OR FROM GETTING MEDICATIONS?: NO

## 2021-03-19 SDOH — ECONOMIC STABILITY: TRANSPORTATION INSECURITY
IN THE PAST 12 MONTHS, HAS LACK OF TRANSPORTATION KEPT YOU FROM MEETINGS, WORK, OR FROM GETTING THINGS NEEDED FOR DAILY LIVING?: NO

## 2021-03-19 SDOH — ECONOMIC STABILITY: INCOME INSECURITY: HOW HARD IS IT FOR YOU TO PAY FOR THE VERY BASICS LIKE FOOD, HOUSING, MEDICAL CARE, AND HEATING?: NOT HARD AT ALL

## 2021-03-19 NOTE — PROGRESS NOTES
3/19/21    Johanna Felix, a female of 63 y.o. came to the office     Johanna Felix presents today for virtual visit for evaluation of of ER follow-up. She was first admitted to the ER for swelling and hypoxia. She was stabilized overnight and discharged home. Several days later she developed a rash that was painful on her right hand. She was given an antiviral agent and sent home and told that she had shingles. She still has some arm discomfort but her symptoms have been improving. During her hospitalization she had blood work which showed a hemoglobin A1c of 8.0 TSH of 10.5 total cholesterol of 209 and LDL cholesterol of 129. She previously was on Metformin but stopped taking this due to diarrhea. She now has gone back on this medicine without any issue. She also used to take cholesterol medication many years ago    Unfortunately, her daughter Petros Torres  unexpectedly since her last appointment. She admits to having significant stress surrounding this is caring for her 2 grandchildren now 3 of which has specialized medical needs. Patient Active Problem List   Diagnosis    Lung nodule    Class 3 obesity in adult    Essential hypertension    Sarcoidosis    Chronic pain syndrome    Chronic bilateral low back pain with bilateral sciatica    Lumbar radiculopathy    Left hip pain    Morbid obesity with BMI of 50.0-59.9, adult (HCC)    Primary osteoarthritis of left hip      No Known Allergies  Current Outpatient Medications on File Prior to Visit   Medication Sig Dispense Refill    valACYclovir (VALTREX) 500 MG tablet Take 500 mg by mouth 3 times daily      aspirin 81 MG tablet Take 81 mg by mouth daily       No current facility-administered medications on file prior to visit. Review of Systems  Constitutional:Negative for activity change, appetite change, chills, fatigue and fever. Respiratory: Negative for choking, chest tightness, shortness of breath and wheezing. Cardiovascular: Negative for chest pain, palpitations and leg swelling. Gastrointestinal: Negative for abdominal distention, constipation, diarrhea, nausea and vomiting. Musculoskeletal: Negative for arthralgias, back pain, gait problem and joint swelling. Neurological: Negative for dizziness, weakness,numbness and headaches. LMP 06/23/2018      Physical Exam   Constitutional:  Oriented to person, place, and time. Appears well-developed and well-nourished. No acute distress. Neurological:Alert and oriented to person, place, and time. Skin: No diaphoresis. Psychiatric: Normal mood and affect. Behavior is Normal.     ASSESSMENT AND PLAN:    Ines Moura was seen today for herpes zoster and results. Diagnoses and all orders for this visit:    Hypothyroidism, unspecified type    Leg swelling  -     potassium chloride (KLOR-CON) 10 MEQ extended release tablet; Take 1 tablet by mouth 2 times daily  -     Comprehensive Metabolic Panel; Future    Type 2 diabetes mellitus without complication, without long-term current use of insulin (HCC)  -     metFORMIN (GLUCOPHAGE) 500 MG tablet; Take 1 tablet by mouth 2 times daily (with meals)    Essential hypertension  -     losartan (COZAAR) 100 MG tablet; Take 1 tablet by mouth daily    Depression, unspecified depression type  -     DULoxetine (CYMBALTA) 60 MG extended release capsule; Take 1 capsule by mouth daily    Other orders  -     furosemide (LASIX) 40 MG tablet; Take 1 tablet by mouth 2 times daily  -     folic acid (FOLVITE) 1 MG tablet; Take 1 tablet by mouth daily  -     levothyroxine (SYNTHROID) 25 MCG tablet; Take 1 tablet by mouth daily  -     vitamin D (ERGOCALCIFEROL) 1.25 MG (32098 UT) CAPS capsule;  Take 1 capsule by mouth once a week      Her shingles appears to be improving    I will start her on Synthroid therapy for her hypothyroidism    She also should resume her own medications including Metformin    We will start her on vitamin D given her vitamin D deficiency    We consider the possibility of adding on antilipid therapy but will defer it for now in light of the multiple medical issues previously discussed    She admits to taking a lot of Tylenol to combat her pain, I will check a CMP. I will see her back in 6 weeks unless she otherwise has an issue    Electronically signed by Jaun Moser DO on 3/19/2021 at 12:44 PM      TeleMedicine Patient Consent    This visit was performed as a virtual video visit using a synchronous, two-way, audio-video telehealth technology platform. Patient identification was verified at the start of the visit, including the patient's telephone number and physical location. I discussed with the patient the nature of our telehealth visits, that:     1. Due to the nature of an audio- video modality, the only components of a physical exam that could be done are the elements supported by direct observation. 2. I would evaluate the patient and recommend diagnostics and treatments based on my assessment. 3. If it was felt that the patient should be evaluated in clinic or an emergency room setting, then they would be directed there. 4. Our sessions are not being recorded and that personal health information is protected. 5. Our team would provide follow up care in person if/when the patient needs it. Patient Does agree to proceed with telemedicine consultation. Patient's location: home address in [address] home address [place]  Other people involved in call - None      Time spent: Not billed by time    This visit was completed virtually using Doxy. me    Due to this being a TeleHealth encounter, evaluation of the following organ systems is limited: Vitals/Constitutional/EENT/Resp/CV/GI//MS/Neuro/Skin/Heme-Lymph-Imm.     Pursuant to the emergency declaration under the [address] [facility] [address] [facility] waiver authority and the [employer] and [employer] Supplemental Appropriations Act, this Virtual  Visit was conducted, with patient's consent, to reduce the patient's risk of exposure to COVID-19 and provide continuity of care for an established patient. Services were provided through a video synchronous discussion virtually to substitute for in-person clinic visit. Please note that the above documentation was prepared using voice recognition software. Every attempt was made to ensure accuracy but there may be spelling, grammatical, and contextual errors.     Electronically signed by Sami Ku DO on 3/19/2021 at 12:44 PM

## 2021-04-19 DIAGNOSIS — E11.9 TYPE 2 DIABETES MELLITUS WITHOUT COMPLICATION, WITHOUT LONG-TERM CURRENT USE OF INSULIN (HCC): ICD-10-CM

## 2021-04-19 DIAGNOSIS — I10 ESSENTIAL HYPERTENSION: ICD-10-CM

## 2021-04-19 RX ORDER — LOSARTAN POTASSIUM 100 MG/1
100 TABLET ORAL DAILY
Qty: 30 TABLET | Refills: 5 | Status: SHIPPED | OUTPATIENT
Start: 2021-04-19

## 2021-04-19 RX ORDER — ERGOCALCIFEROL 1.25 MG/1
50000 CAPSULE ORAL WEEKLY
Qty: 4 CAPSULE | Refills: 2 | Status: SHIPPED
Start: 2021-04-19 | End: 2021-07-15

## 2021-05-10 LAB — PRO BNP, N-TERMINAL: 88 PG/ML (ref 0–125)

## 2021-05-12 ENCOUNTER — HOSPITAL ENCOUNTER (OUTPATIENT)
Age: 56
Setting detail: OBSERVATION
Discharge: HOME | End: 2021-05-12
Attending: INTERNAL MEDICINE | Admitting: INTERNAL MEDICINE
Payer: MEDICAID

## 2021-05-12 VITALS
DIASTOLIC BLOOD PRESSURE: 66 MMHG | TEMPERATURE: 97.4 F | OXYGEN SATURATION: 92 % | BODY MASS INDEX: 50.02 KG/M2 | HEART RATE: 95 BPM | SYSTOLIC BLOOD PRESSURE: 120 MMHG | HEIGHT: 64 IN | RESPIRATION RATE: 18 BRPM | WEIGHT: 293 LBS

## 2021-05-12 DIAGNOSIS — I25.10 CAD IN NATIVE ARTERY: ICD-10-CM

## 2021-05-12 LAB
ABO/RH: NORMAL
ANTIBODY SCREEN: NORMAL
LV EF: 50 %
LVEF MODALITY: NORMAL

## 2021-05-12 PROCEDURE — 36415 COLL VENOUS BLD VENIPUNCTURE: CPT

## 2021-05-12 PROCEDURE — 2709999900 HC NON-CHARGEABLE SUPPLY

## 2021-05-12 PROCEDURE — 2500000003 HC RX 250 WO HCPCS

## 2021-05-12 PROCEDURE — C1725 CATH, TRANSLUMIN NON-LASER: HCPCS

## 2021-05-12 PROCEDURE — 93458 L HRT ARTERY/VENTRICLE ANGIO: CPT | Performed by: INTERNAL MEDICINE

## 2021-05-12 PROCEDURE — C1769 GUIDE WIRE: HCPCS

## 2021-05-12 PROCEDURE — C1894 INTRO/SHEATH, NON-LASER: HCPCS

## 2021-05-12 PROCEDURE — C1887 CATHETER, GUIDING: HCPCS

## 2021-05-12 PROCEDURE — 86900 BLOOD TYPING SEROLOGIC ABO: CPT

## 2021-05-12 PROCEDURE — G0379 DIRECT REFER HOSPITAL OBSERV: HCPCS

## 2021-05-12 PROCEDURE — G0378 HOSPITAL OBSERVATION PER HR: HCPCS

## 2021-05-12 PROCEDURE — 6360000002 HC RX W HCPCS

## 2021-05-12 PROCEDURE — 86850 RBC ANTIBODY SCREEN: CPT

## 2021-05-12 PROCEDURE — 86901 BLOOD TYPING SEROLOGIC RH(D): CPT

## 2021-05-12 PROCEDURE — 6370000000 HC RX 637 (ALT 250 FOR IP): Performed by: INTERNAL MEDICINE

## 2021-05-12 RX ORDER — ACETAMINOPHEN 325 MG/1
650 TABLET ORAL EVERY 4 HOURS PRN
Status: DISCONTINUED | OUTPATIENT
Start: 2021-05-12 | End: 2021-05-13 | Stop reason: HOSPADM

## 2021-05-12 RX ORDER — ATORVASTATIN CALCIUM 40 MG/1
40 TABLET, FILM COATED ORAL DAILY
COMMUNITY
End: 2021-06-09

## 2021-05-12 RX ORDER — ASPIRIN 325 MG
325 TABLET ORAL ONCE
Status: COMPLETED | OUTPATIENT
Start: 2021-05-12 | End: 2021-05-12

## 2021-05-12 RX ORDER — ASPIRIN 325 MG
325 TABLET ORAL DAILY
Status: DISCONTINUED | OUTPATIENT
Start: 2021-05-12 | End: 2021-05-12

## 2021-05-12 RX ADMIN — ASPIRIN 325 MG: 325 TABLET ORAL at 17:31

## 2021-05-12 NOTE — PROCEDURES
Procedure:    1. Left heart cath    Physician: Be Rivas. Prasad LEONG Assistant: none    Indication: Abnormal stress test abnormal stress test  AUC: 7  AUC indication: 16    Complication: None. Sedation: Intravenous Versed. Anesthesia: Xylocaine, fentanyl     Sedation time: I was present for sedation and ministration at 1835 and I ended sedation at 1856 for a total face-to-face sedation time of 21 minutes. Estimated blood loss: Minimal    Specimens: none    Contrast used: 100 cc    Hemodynamics:  Opening Aortic pressure: 662/33  LV systolic pressure: 810  LVEDP: 24  No significant gradient across the aortic valve    Angiographic Results/findings:  Left Main: No angiographically significant stenosis  LAD: Tortuous. No angiographically significant stenosis  D1: Bifurcating vessel. No angiographically significant stenosis. D2: Small vessel. Cx: Tortuous. No angiographically significant stenosis. OM1: No angiographically significant stenosis. Ramus: No angiographically significant stenosis. RCA: Huge vessel with anterior takeoff. Hyper-dominant. No angiographically significant stenosis. PDA: No angiographically significant stenosis. PLB: No angiographically significant stenosis. LV: Normal LV size and systolic function. No wall motion abnormalities. Ejection fraction 50%    Procedure:   After obtaining informed consent the patient was taken to the cardiac Cath Lab where the area over the right radial artery was prepped and draped in a sterile fashion. Using ultrasound guidance and a micropuncture technique a 6 Austrian slender glide sheath was placed in the right radial artery. This was aspirated & flushed several times throughout the procedure. This was medicated with verapamil and nitroglycerin. They were given heparin systemically. A 5 [place] TIG catheter was advanced over a wire to the root of the aorta. It was aspirated & flushed with saline. Pressures were obtained.   It was filled with contrast.  This would not reach the left main or the right coronary artery. It was changed for an L3.5. This was then manipulated into the left main coronary artery. 4 orthogonal views were obtained. A Edmundo catheter was unsuccessful in reaching the right coronary artery. An AL-1 was then successful. This had a very anterior takeoff. A AL-1 catheter was then manipulated into the right coronary artery and 3 orthogonal views were then obtained. A 5 Lao angled pigtail was then advanced & manipulated into the left ventricle. This was then aspirated & flushed with saline & pressures were obtained. An DUDLEY view was then obtained. The catheter was then aspirated & flushed with saline once again & pull back pressures were then obtained across the aortic vlave. The right radial artery sheath was removed and a vasc band was then placed with good patent hemostasis. They tolerated the procedure well with no complications. Note: This report was completed using computerized voice recognition software. Every effort has been made to ensure accuracy, however; and invert and computerized transcription errors may be present.

## 2021-06-07 ENCOUNTER — TELEPHONE (OUTPATIENT)
Dept: ENT CLINIC | Age: 56
End: 2021-06-07

## 2021-06-07 ENCOUNTER — TELEPHONE (OUTPATIENT)
Dept: PRIMARY CARE CLINIC | Age: 56
End: 2021-06-07

## 2021-06-07 NOTE — TELEPHONE ENCOUNTER
Patient last seen [id number] [facility] sinus problems. Patient called in complaining sinus problems/low oxygen levels which she feels is related to sinus issues. Patient wanted to be seen by nurse practitioner to be evaluated sooner. Scheduled 6/25/21.  Please advise patient if can be seen sooner with Dr. Lynnette Blanton

## 2021-06-07 NOTE — TELEPHONE ENCOUNTER
She needs to have this formally documented in order to qualify for oxygen. I would defer this until she is evaluated. She should have had this set up from the hospitalization if she required it.

## 2021-06-08 NOTE — TELEPHONE ENCOUNTER
Called and advised patient soonest appointment would be June 25,2021 with NP. Patient will keep appointment.

## 2021-06-09 ENCOUNTER — VIRTUAL VISIT (OUTPATIENT)
Dept: PRIMARY CARE CLINIC | Age: 56
End: 2021-06-09
Payer: MEDICAID

## 2021-06-09 DIAGNOSIS — G47.30 SLEEP APNEA, UNSPECIFIED TYPE: ICD-10-CM

## 2021-06-09 DIAGNOSIS — G89.29 CHRONIC BACK PAIN, UNSPECIFIED BACK LOCATION, UNSPECIFIED BACK PAIN LATERALITY: Primary | ICD-10-CM

## 2021-06-09 DIAGNOSIS — M54.9 CHRONIC BACK PAIN, UNSPECIFIED BACK LOCATION, UNSPECIFIED BACK PAIN LATERALITY: Primary | ICD-10-CM

## 2021-06-09 DIAGNOSIS — E66.01 CLASS 3 SEVERE OBESITY DUE TO EXCESS CALORIES WITHOUT SERIOUS COMORBIDITY WITH BODY MASS INDEX (BMI) OF 45.0 TO 49.9 IN ADULT (HCC): ICD-10-CM

## 2021-06-09 DIAGNOSIS — E11.9 TYPE 2 DIABETES MELLITUS WITHOUT COMPLICATION, WITHOUT LONG-TERM CURRENT USE OF INSULIN (HCC): ICD-10-CM

## 2021-06-09 DIAGNOSIS — I10 ESSENTIAL HYPERTENSION: ICD-10-CM

## 2021-06-09 DIAGNOSIS — M79.89 LEG SWELLING: ICD-10-CM

## 2021-06-09 DIAGNOSIS — R60.1 ANASARCA: ICD-10-CM

## 2021-06-09 PROCEDURE — 1036F TOBACCO NON-USER: CPT | Performed by: INTERNAL MEDICINE

## 2021-06-09 PROCEDURE — G8417 CALC BMI ABV UP PARAM F/U: HCPCS | Performed by: INTERNAL MEDICINE

## 2021-06-09 PROCEDURE — 3017F COLORECTAL CA SCREEN DOC REV: CPT | Performed by: INTERNAL MEDICINE

## 2021-06-09 PROCEDURE — 3044F HG A1C LEVEL LT 7.0%: CPT | Performed by: INTERNAL MEDICINE

## 2021-06-09 PROCEDURE — 99213 OFFICE O/P EST LOW 20 MIN: CPT | Performed by: INTERNAL MEDICINE

## 2021-06-09 PROCEDURE — 2022F DILAT RTA XM EVC RTNOPTHY: CPT | Performed by: INTERNAL MEDICINE

## 2021-06-09 PROCEDURE — G8427 DOCREV CUR MEDS BY ELIG CLIN: HCPCS | Performed by: INTERNAL MEDICINE

## 2021-06-10 ENCOUNTER — HOSPITAL ENCOUNTER (INPATIENT)
Age: 56
LOS: 3 days | Discharge: HOME | DRG: 133 | End: 2021-06-18
Attending: EMERGENCY MEDICINE | Admitting: FAMILY MEDICINE
Payer: MEDICAID

## 2021-06-10 ENCOUNTER — APPOINTMENT (OUTPATIENT)
Dept: CT IMAGING | Age: 56
DRG: 133 | End: 2021-06-10
Payer: MEDICAID

## 2021-06-10 ENCOUNTER — APPOINTMENT (OUTPATIENT)
Dept: GENERAL RADIOLOGY | Age: 56
DRG: 133 | End: 2021-06-10
Payer: MEDICAID

## 2021-06-10 DIAGNOSIS — J96.01 ACUTE RESPIRATORY FAILURE WITH HYPOXIA (HCC): Primary | ICD-10-CM

## 2021-06-10 PROBLEM — R09.02 HYPOXIA: Status: ACTIVE | Noted: 2021-06-10

## 2021-06-10 LAB
ANION GAP SERPL CALCULATED.3IONS-SCNC: 8 MMOL/L (ref 7–16)
BASOPHILS ABSOLUTE: 0.04 E9/L (ref 0–0.2)
BASOPHILS RELATIVE PERCENT: 0.4 % (ref 0–2)
BUN BLDV-MCNC: 29 MG/DL (ref 6–20)
CALCIUM SERPL-MCNC: 10.1 MG/DL (ref 8.6–10.2)
CHLORIDE BLD-SCNC: 97 MMOL/L (ref 98–107)
CO2: 34 MMOL/L (ref 22–29)
CREAT SERPL-MCNC: 1.1 MG/DL (ref 0.5–1)
EKG ATRIAL RATE: 85 BPM
EKG P AXIS: 54 DEGREES
EKG P-R INTERVAL: 150 MS
EKG Q-T INTERVAL: 394 MS
EKG QRS DURATION: 94 MS
EKG QTC CALCULATION (BAZETT): 468 MS
EKG R AXIS: 18 DEGREES
EKG T AXIS: 52 DEGREES
EKG VENTRICULAR RATE: 85 BPM
EOSINOPHILS ABSOLUTE: 0.26 E9/L (ref 0.05–0.5)
EOSINOPHILS RELATIVE PERCENT: 2.6 % (ref 0–6)
GFR AFRICAN AMERICAN: >60
GFR NON-AFRICAN AMERICAN: 51 ML/MIN/1.73
GLUCOSE BLD-MCNC: 170 MG/DL (ref 74–99)
HBA1C MFR BLD: 6.9 % (ref 4–5.6)
HCT VFR BLD CALC: 45.5 % (ref 34–48)
HEMOGLOBIN: 14.2 G/DL (ref 11.5–15.5)
IMMATURE GRANULOCYTES #: 0.08 E9/L
IMMATURE GRANULOCYTES %: 0.8 % (ref 0–5)
LYMPHOCYTES ABSOLUTE: 1.66 E9/L (ref 1.5–4)
LYMPHOCYTES RELATIVE PERCENT: 16.8 % (ref 20–42)
MCH RBC QN AUTO: 28.2 PG (ref 26–35)
MCHC RBC AUTO-ENTMCNC: 31.2 % (ref 32–34.5)
MCV RBC AUTO: 90.5 FL (ref 80–99.9)
MONOCYTES ABSOLUTE: 0.74 E9/L (ref 0.1–0.95)
MONOCYTES RELATIVE PERCENT: 7.5 % (ref 2–12)
NEUTROPHILS ABSOLUTE: 7.1 E9/L (ref 1.8–7.3)
NEUTROPHILS RELATIVE PERCENT: 71.9 % (ref 43–80)
PDW BLD-RTO: 15.2 FL (ref 11.5–15)
PLATELET # BLD: 303 E9/L (ref 130–450)
PMV BLD AUTO: 10.1 FL (ref 7–12)
POTASSIUM SERPL-SCNC: 4.5 MMOL/L (ref 3.5–5)
PRO-BNP: 62 PG/ML (ref 0–125)
RBC # BLD: 5.03 E12/L (ref 3.5–5.5)
SARS-COV-2, NAAT: NOT DETECTED
SODIUM BLD-SCNC: 139 MMOL/L (ref 132–146)
TROPONIN, HIGH SENSITIVITY: 10 NG/L (ref 0–9)
WBC # BLD: 9.9 E9/L (ref 4.5–11.5)

## 2021-06-10 PROCEDURE — 6370000000 HC RX 637 (ALT 250 FOR IP): Performed by: EMERGENCY MEDICINE

## 2021-06-10 PROCEDURE — 99219 PR INITIAL OBSERVATION CARE/DAY 50 MINUTES: CPT | Performed by: FAMILY MEDICINE

## 2021-06-10 PROCEDURE — 6370000000 HC RX 637 (ALT 250 FOR IP): Performed by: FAMILY MEDICINE

## 2021-06-10 PROCEDURE — 6360000004 HC RX CONTRAST MEDICATION: Performed by: RADIOLOGY

## 2021-06-10 PROCEDURE — 99284 EMERGENCY DEPT VISIT MOD MDM: CPT

## 2021-06-10 PROCEDURE — G0378 HOSPITAL OBSERVATION PER HR: HCPCS

## 2021-06-10 PROCEDURE — 84484 ASSAY OF TROPONIN QUANT: CPT

## 2021-06-10 PROCEDURE — 87040 BLOOD CULTURE FOR BACTERIA: CPT

## 2021-06-10 PROCEDURE — 71275 CT ANGIOGRAPHY CHEST: CPT

## 2021-06-10 PROCEDURE — 87635 SARS-COV-2 COVID-19 AMP PRB: CPT

## 2021-06-10 PROCEDURE — 93010 ELECTROCARDIOGRAM REPORT: CPT | Performed by: INTERNAL MEDICINE

## 2021-06-10 PROCEDURE — 83036 HEMOGLOBIN GLYCOSYLATED A1C: CPT

## 2021-06-10 PROCEDURE — 83880 ASSAY OF NATRIURETIC PEPTIDE: CPT

## 2021-06-10 PROCEDURE — 99223 1ST HOSP IP/OBS HIGH 75: CPT | Performed by: INTERNAL MEDICINE

## 2021-06-10 PROCEDURE — 2580000003 HC RX 258: Performed by: FAMILY MEDICINE

## 2021-06-10 PROCEDURE — 80048 BASIC METABOLIC PNL TOTAL CA: CPT

## 2021-06-10 PROCEDURE — 85025 COMPLETE CBC W/AUTO DIFF WBC: CPT

## 2021-06-10 PROCEDURE — 93005 ELECTROCARDIOGRAM TRACING: CPT | Performed by: PHYSICIAN ASSISTANT

## 2021-06-10 PROCEDURE — 94640 AIRWAY INHALATION TREATMENT: CPT

## 2021-06-10 PROCEDURE — 36415 COLL VENOUS BLD VENIPUNCTURE: CPT

## 2021-06-10 PROCEDURE — 6370000000 HC RX 637 (ALT 250 FOR IP): Performed by: INTERNAL MEDICINE

## 2021-06-10 RX ORDER — BUMETANIDE 0.25 MG/ML
1 INJECTION, SOLUTION INTRAMUSCULAR; INTRAVENOUS EVERY 6 HOURS
Status: DISCONTINUED | OUTPATIENT
Start: 2021-06-11 | End: 2021-06-18 | Stop reason: HOSPADM

## 2021-06-10 RX ORDER — ALBUTEROL SULFATE 2.5 MG/3ML
2.5 SOLUTION RESPIRATORY (INHALATION)
Status: DISCONTINUED | OUTPATIENT
Start: 2021-06-10 | End: 2021-06-18 | Stop reason: HOSPADM

## 2021-06-10 RX ORDER — DEXTROSE MONOHYDRATE 25 G/50ML
12.5 INJECTION, SOLUTION INTRAVENOUS PRN
Status: DISCONTINUED | OUTPATIENT
Start: 2021-06-10 | End: 2021-06-18 | Stop reason: HOSPADM

## 2021-06-10 RX ORDER — ASPIRIN 81 MG/1
81 TABLET, CHEWABLE ORAL DAILY
Status: DISCONTINUED | OUTPATIENT
Start: 2021-06-10 | End: 2021-06-18 | Stop reason: HOSPADM

## 2021-06-10 RX ORDER — IPRATROPIUM BROMIDE AND ALBUTEROL SULFATE 2.5; .5 MG/3ML; MG/3ML
1 SOLUTION RESPIRATORY (INHALATION)
Status: DISCONTINUED | OUTPATIENT
Start: 2021-06-10 | End: 2021-06-10

## 2021-06-10 RX ORDER — DEXTROSE MONOHYDRATE 50 MG/ML
100 INJECTION, SOLUTION INTRAVENOUS PRN
Status: DISCONTINUED | OUTPATIENT
Start: 2021-06-10 | End: 2021-06-18 | Stop reason: HOSPADM

## 2021-06-10 RX ORDER — ONDANSETRON 4 MG/1
4 TABLET, ORALLY DISINTEGRATING ORAL EVERY 8 HOURS PRN
Status: DISCONTINUED | OUTPATIENT
Start: 2021-06-10 | End: 2021-06-18 | Stop reason: HOSPADM

## 2021-06-10 RX ORDER — ACETAMINOPHEN 650 MG/1
650 SUPPOSITORY RECTAL EVERY 6 HOURS PRN
Status: DISCONTINUED | OUTPATIENT
Start: 2021-06-10 | End: 2021-06-18 | Stop reason: HOSPADM

## 2021-06-10 RX ORDER — FUROSEMIDE 40 MG/1
40 TABLET ORAL 2 TIMES DAILY
Status: DISCONTINUED | OUTPATIENT
Start: 2021-06-10 | End: 2021-06-10

## 2021-06-10 RX ORDER — HYDROCODONE BITARTRATE AND ACETAMINOPHEN 5; 325 MG/1; MG/1
1 TABLET ORAL EVERY 6 HOURS PRN
Status: DISCONTINUED | OUTPATIENT
Start: 2021-06-10 | End: 2021-06-11

## 2021-06-10 RX ORDER — FOLIC ACID 1 MG/1
1 TABLET ORAL DAILY
Status: DISCONTINUED | OUTPATIENT
Start: 2021-06-10 | End: 2021-06-18 | Stop reason: HOSPADM

## 2021-06-10 RX ORDER — IPRATROPIUM BROMIDE AND ALBUTEROL SULFATE 2.5; .5 MG/3ML; MG/3ML
1 SOLUTION RESPIRATORY (INHALATION) ONCE
Status: COMPLETED | OUTPATIENT
Start: 2021-06-10 | End: 2021-06-10

## 2021-06-10 RX ORDER — POTASSIUM CHLORIDE 750 MG/1
10 TABLET, EXTENDED RELEASE ORAL 2 TIMES DAILY WITH MEALS
Status: DISCONTINUED | OUTPATIENT
Start: 2021-06-10 | End: 2021-06-18 | Stop reason: HOSPADM

## 2021-06-10 RX ORDER — NICOTINE POLACRILEX 4 MG
15 LOZENGE BUCCAL PRN
Status: DISCONTINUED | OUTPATIENT
Start: 2021-06-10 | End: 2021-06-18 | Stop reason: HOSPADM

## 2021-06-10 RX ORDER — SODIUM CHLORIDE 0.9 % (FLUSH) 0.9 %
5-40 SYRINGE (ML) INJECTION EVERY 12 HOURS SCHEDULED
Status: DISCONTINUED | OUTPATIENT
Start: 2021-06-10 | End: 2021-06-18 | Stop reason: HOSPADM

## 2021-06-10 RX ORDER — SODIUM CHLORIDE 0.9 % (FLUSH) 0.9 %
5-40 SYRINGE (ML) INJECTION PRN
Status: DISCONTINUED | OUTPATIENT
Start: 2021-06-10 | End: 2021-06-18 | Stop reason: HOSPADM

## 2021-06-10 RX ORDER — ACETAMINOPHEN 325 MG/1
650 TABLET ORAL EVERY 6 HOURS PRN
Status: DISCONTINUED | OUTPATIENT
Start: 2021-06-10 | End: 2021-06-18 | Stop reason: HOSPADM

## 2021-06-10 RX ORDER — POLYETHYLENE GLYCOL 3350 17 G/17G
17 POWDER, FOR SOLUTION ORAL DAILY PRN
Status: DISCONTINUED | OUTPATIENT
Start: 2021-06-10 | End: 2021-06-18 | Stop reason: HOSPADM

## 2021-06-10 RX ORDER — LOSARTAN POTASSIUM 50 MG/1
100 TABLET ORAL DAILY
Status: DISCONTINUED | OUTPATIENT
Start: 2021-06-10 | End: 2021-06-18 | Stop reason: HOSPADM

## 2021-06-10 RX ORDER — LEVOTHYROXINE SODIUM 0.03 MG/1
25 TABLET ORAL DAILY
Status: DISCONTINUED | OUTPATIENT
Start: 2021-06-10 | End: 2021-06-18 | Stop reason: HOSPADM

## 2021-06-10 RX ORDER — SODIUM CHLORIDE 9 MG/ML
25 INJECTION, SOLUTION INTRAVENOUS PRN
Status: DISCONTINUED | OUTPATIENT
Start: 2021-06-10 | End: 2021-06-18 | Stop reason: HOSPADM

## 2021-06-10 RX ORDER — ONDANSETRON 2 MG/ML
4 INJECTION INTRAMUSCULAR; INTRAVENOUS EVERY 6 HOURS PRN
Status: DISCONTINUED | OUTPATIENT
Start: 2021-06-10 | End: 2021-06-18 | Stop reason: HOSPADM

## 2021-06-10 RX ADMIN — IOPAMIDOL 90 ML: 755 INJECTION, SOLUTION INTRAVENOUS at 03:40

## 2021-06-10 RX ADMIN — METFORMIN HYDROCHLORIDE 500 MG: 500 TABLET ORAL at 17:06

## 2021-06-10 RX ADMIN — POTASSIUM CHLORIDE 10 MEQ: 750 TABLET, EXTENDED RELEASE ORAL at 10:13

## 2021-06-10 RX ADMIN — LOSARTAN POTASSIUM 100 MG: 50 TABLET, FILM COATED ORAL at 10:12

## 2021-06-10 RX ADMIN — POTASSIUM CHLORIDE 10 MEQ: 750 TABLET, EXTENDED RELEASE ORAL at 17:06

## 2021-06-10 RX ADMIN — FOLIC ACID 1 MG: 1 TABLET ORAL at 10:13

## 2021-06-10 RX ADMIN — LEVOTHYROXINE SODIUM 25 MCG: 25 TABLET ORAL at 10:13

## 2021-06-10 RX ADMIN — FUROSEMIDE 40 MG: 40 TABLET ORAL at 10:12

## 2021-06-10 RX ADMIN — HYDROCODONE BITARTRATE AND ACETAMINOPHEN 1 TABLET: 5; 325 TABLET ORAL at 13:42

## 2021-06-10 RX ADMIN — ASPIRIN 81 MG: 81 TABLET, CHEWABLE ORAL at 10:13

## 2021-06-10 RX ADMIN — IPRATROPIUM BROMIDE AND ALBUTEROL SULFATE 1 AMPULE: .5; 3 SOLUTION RESPIRATORY (INHALATION) at 05:09

## 2021-06-10 RX ADMIN — SODIUM CHLORIDE, PRESERVATIVE FREE 10 ML: 5 INJECTION INTRAVENOUS at 20:28

## 2021-06-10 RX ADMIN — FUROSEMIDE 40 MG: 40 TABLET ORAL at 20:28

## 2021-06-10 RX ADMIN — SODIUM CHLORIDE, PRESERVATIVE FREE 10 ML: 5 INJECTION INTRAVENOUS at 10:13

## 2021-06-10 RX ADMIN — METFORMIN HYDROCHLORIDE 500 MG: 500 TABLET ORAL at 10:12

## 2021-06-10 ASSESSMENT — PAIN SCALES - GENERAL
PAINLEVEL_OUTOF10: 10
PAINLEVEL_OUTOF10: 0

## 2021-06-10 NOTE — PROGRESS NOTES
Patient verbalized that she does not want her blood sugars checked via finger sticks nor does she want to receive insulin as these are not part of her at home treatment

## 2021-06-10 NOTE — ED NOTES
FIRST PROVIDER CONTACT ASSESSMENT NOTE      Department of Emergency Medicine   Admit Date: No admission date for patient encounter. Chief Complaint: Shortness of Breath (hypoxic upon ambulation, seen at [facility] this AM, recent dx of chf)      History of Present Illness:    Vin Odom is a 63 y.o. female who presents to the ED for chest pain, shortness of breath, low oxygen. She was recently diagnosed with CHF and was in hospital for diuresis over weekend. She reports she has lower extremity swelling and is usual for her.   No respiratory distress, HRRR, LUngs CTAB        -----------------END OF FIRST PROVIDER CONTACT ASSESSMENT NOTE--------------  Electronically signed by Fay Robles PA-C   DD: 6/9/21               Fay Robles PA-C  06/09/21 [id number]

## 2021-06-10 NOTE — H&P
[facility] History and Physical      CHIEF COMPLAINT:  SOB     History of Present Illness:  63year old female with past medical history of Sarcoid and DM . She presents to the ED due to worsening shortness of breath . She states that pulse at home was in the 80s . She has no history of oxygen use . She states that with talking her oxygen decreases She underwent cardiac work-up with a negative catheterization. She reports no chest pain fever chills abdominal pain nausea or vomiting  . In the ED creatinine was 1.1 BNP 62 Trop HS 10 CXR revealed atelectasis . CTA revealed no PE          REVIEW OF SYSTEMS:  A comprehensive review of systems was negative except for: what is in the HPI      PMH:  Past Medical History:   Diagnosis Date    CAD in native artery 5/12/2021    Depression     Diabetes mellitus [facility] [address] [address]     Hypertension     Obesity     bmi 45.5 weight 273 #    Osteoarthritis     Sarcoidosis of lung ([facility] [address] [address]     SOB (shortness of breath)     Thyroid disease        Surgical History:  Past Surgical History:   Procedure Laterality Date    BRONCHOSCOPY  01/24/2018    Dr. Adalberto Guajardo         Medications Prior to Admission:    Prior to Admission medications    Medication Sig Start Date End Date Taking?  Authorizing Provider   vitamin D (ERGOCALCIFEROL) 1.25 MG (18491 UT) CAPS capsule Take 1 capsule by mouth once a week 4/19/21  Yes Eugenia Guerrero, DO   metFORMIN (GLUCOPHAGE) 500 MG tablet Take 1 tablet by mouth 2 times daily (with meals) 4/19/21  Yes Kelvin Temple, DO   losartan (COZAAR) 100 MG tablet Take 1 tablet by mouth daily 4/19/21  Yes Kelvin Temple DO   potassium chloride (KLOR-CON) 10 MEQ extended release tablet Take 1 tablet by mouth 2 times daily 3/19/21  Yes Kelvin Temple DO   furosemide (LASIX) 40 MG tablet Take 1 tablet by mouth 2 times daily 3/19/21  Yes Kelvin Temple DO   folic acid (FOLVITE) 1 MG tablet Take 1 tablet by mouth daily 3/19/21  Yes Liudmila Temple, DO   levothyroxine (SYNTHROID) 25 MCG tablet Take 1 tablet by mouth daily 3/19/21  Yes Liudmila Temple, DO   aspirin 81 MG tablet Take 81 mg by mouth daily   Yes Historical Provider, MD       Allergies:    Patient has no known allergies. Social History:    reports that she has never smoked. She has never used smokeless tobacco. She reports that she does not drink alcohol and does not use drugs. Family History:   family history includes Coronary Art Dis in her brother; Diabetes in her mother. PHYSICAL EXAM:  Vitals:  /74   Pulse 88   Temp 98.5 °F (36.9 °C) (Tympanic)   Resp 16   Ht 5' 4\" (1.626 m)   Wt 280 lb (127 kg)   LMP 06/23/2018   SpO2 (S) 97% Comment: awake  BMI 48.06 kg/m²     General Appearance: alert and oriented to person, place and time and in no acute distress  Skin: warm and dry  Head: normocephalic and atraumatic  Eyes: pupils equal, round, and reactive to light, extraocular eye movements intact, conjunctivae normal  Neck: neck supple and non tender without mass   Pulmonary/Chest: clear to auscultation bilaterally- no wheezes, rales or rhonchi, normal air movement, no respiratory distress  Cardiovascular: normal rate, normal S1 and S2 and no carotid bruits  Abdomen: soft, non-tender, non-distended, normal bowel sounds, no masses or organomegaly  Extremities: no cyanosis, no clubbing and no edema  Neurologic: no cranial nerve deficit and speech normal        LABS:  Recent Labs     06/10/21  0001      K 4.5   CL 97*   CO2 34*   BUN 29*   CREATININE 1.1*   GLUCOSE 170*   CALCIUM 10.1       Recent Labs     06/10/21  0001   WBC 9.9   RBC 5.03   HGB 14.2   HCT 45.5   MCV 90.5   MCH 28.2   MCHC 31.2*   RDW 15.2*      MPV 10.1       No results for input(s): POCGLU in the last 72 hours. Radiology:   CTA PULMONARY W CONTRAST   Final Result   No evidence of acute pulmonary embolic disease.   Likely old granulomatous disease as outlined above. CXR    IMPRESSION:        Left lower lobe patchy atelectasis is seen.                         CTA   No evidence of acute pulmonary embolic disease.  Likely old granulomatous   disease as outlined above.                 ASSESSMENT:      Active Problems:    Hypoxia  Resolved Problems:    * No resolved hospital problems. *      PLAN:    1. Acute hypoxic respiratory failure ; patient reports oxygen saturation in the 80s She has no history of oxygen use at home . She is on 2L NC in ED and saturating 97% . BNP 62 Trop 10 COVID neg . CXR revealed no acute process CTA revealed no PE but granulomatous  disease . Patient had a negative heart cath and 2D echo 3/2021  reveals an EF of 50-55% with normal  LV  Wall motion  Unclear etiology Admit observation vitals telemetry Pulmonology consult  wean oxygen as tolerated     History of Sarcoidosis     Type 2 DM: C/w Metformin       Hypertension ; C/w Losartan Lasix     Hypothyroidism ; C/w Synthroid       Code Status: Full   DVT prophylaxis: Lovenox       NOTE: This report was transcribed using voice recognition software. Every effort was made to ensure accuracy; however, inadvertent computerized transcription errors may be present.   Electronically signed by Odell Han MD on 6/10/2021 at 6:39 AM

## 2021-06-10 NOTE — ED PROVIDER NOTES
HPI:  6/10/21,   Time: 2:30 AM EDT       Sae Araujo is a 63 y.o. female presenting to the ED for shortness of breath, beginning several days ago. The complaint has been persistent, moderate in severity, and worsened by nothing. The patient states that she recently was in the hospital.  She underwent cardiac work-up with a negative catheterization. She states that over the last few days she has had shortness of breath and hypoxia. She states that she has a pulse ox at home and has been monitoring her oxygen levels. She states that when she is ambulating or even sitting there randomly her oxygen levels were dropping to the 80s and sometimes into the 70s. Therefore she came to the ED to be evaluated. Upon arrival to the ED here the patient was satting 80% on room air. Patient denies any chest pain. No nausea vomiting diarrhea. No abdominal pain. No palpitations. No cough congestion. Review of Systems:   Pertinent positives and negatives are stated within HPI, all other systems reviewed and are negative.          --------------------------------------------- PAST HISTORY ---------------------------------------------  Past Medical History:  has a past medical history of CAD in native artery, Depression, Diabetes mellitus ([facility] [address] Hypertension, Obesity, Osteoarthritis, Sarcoidosis of lung ([facility] [address] SOB (shortness of breath), and Thyroid disease. Past Surgical History:  has a past surgical history that includes knee surgery (Left); Tubal ligation; and bronchoscopy (01/24/2018). Social History:  reports that she has never smoked. She has never used smokeless tobacco. She reports that she does not drink alcohol and does not use drugs. Family History: family history includes Coronary Art Dis in her brother; Diabetes in her mother. The patients home medications have been reviewed. Allergies: Patient has no known allergies.         ---------------------------------------------------PHYSICAL EXAM--------------------------------------    Constitutional/General: Alert and oriented x3, no acute distress  Head: Normocephalic and atraumatic  Eyes: PERRL, EOMI, conjunctive normal, sclera non icteric  Mouth: Oropharynx clear, handling secretions, no trismus, no asymmetry of the posterior oropharynx or uvular edema  Neck: Supple, full ROM, non tender to palpation in the midline, no stridor, no crepitus, no meningeal signs  Respiratory: Lungs clear to auscultation bilaterally, no wheezes, rales, or rhonchi. Not in respiratory distress  Cardiovascular:  Regular rate. Regular rhythm. No murmurs, gallops, or rubs. 2+ distal pulses  GI:  Abdomen Soft, Non tender, Non distended. +BS. No organomegaly, no palpable masses,  No rebound, guarding, or rigidity. Musculoskeletal: Moves all extremities x 4. Warm and well perfused, no clubbing, cyanosis. Edema to bilateral lower extremities capillary refill <3 seconds  Integument: skin warm and dry. No rashes. Neurologic: GCS 15, no focal deficits, symmetric strength 5/5 in the upper and lower extremities bilaterally  Psychiatric: Normal Affect    -------------------------------------------------- RESULTS -------------------------------------------------  I have personally reviewed all laboratory and imaging results for this patient. Results are listed below.      LABS:  Results for orders placed or performed during the hospital encounter of 06/10/21   COVID-19, Rapid    Specimen: Nasopharyngeal Swab   Result Value Ref Range    SARS-CoV-2, NAAT Not Detected Not Detected   CBC Auto Differential   Result Value Ref Range    WBC 9.9 4.5 - 11.5 E9/L    RBC 5.03 3.50 - 5.50 E12/L    Hemoglobin 14.2 11.5 - 15.5 g/dL    Hematocrit 45.5 34.0 - 48.0 %    MCV 90.5 80.0 - 99.9 fL    MCH 28.2 26.0 - 35.0 pg    MCHC 31.2 (L) 32.0 - 34.5 %    RDW 15.2 (H) 11.5 - 15.0 fL    Platelets 312 770 - 004 E9/L    MPV 10.1 7.0 - 12.0 fL    Neutrophils % 71.9 43.0 - 80.0 %    Immature Granulocytes % 0.8 0.0 - 5.0 %    Lymphocytes % 16.8 (L) 20.0 - 42.0 %    Monocytes % 7.5 2.0 - 12.0 %    Eosinophils % 2.6 0.0 - 6.0 %    Basophils % 0.4 0.0 - 2.0 %    Neutrophils Absolute 7.10 1.80 - 7.30 E9/L    Immature Granulocytes # 0.08 E9/L    Lymphocytes Absolute 1.66 1.50 - 4.00 E9/L    Monocytes Absolute 0.74 0.10 - 0.95 E9/L    Eosinophils Absolute 0.26 0.05 - 0.50 E9/L    Basophils Absolute 0.04 0.00 - 0.20 S6/Z   Basic Metabolic Panel   Result Value Ref Range    Sodium 139 132 - 146 mmol/L    Potassium 4.5 3.5 - 5.0 mmol/L    Chloride 97 (L) 98 - 107 mmol/L    CO2 34 (H) 22 - 29 mmol/L    Anion Gap 8 7 - 16 mmol/L    Glucose 170 (H) 74 - 99 mg/dL    BUN 29 (H) 6 - 20 mg/dL    CREATININE 1.1 (H) 0.5 - 1.0 mg/dL    GFR Non-African American 51 >=60 mL/min/1.73    GFR African American >60     Calcium 10.1 8.6 - 10.2 mg/dL   Brain Natriuretic Peptide   Result Value Ref Range    Pro-BNP 62 0 - 125 pg/mL   Troponin   Result Value Ref Range    Troponin, High Sensitivity 10 (H) 0 - 9 ng/L   EKG 12 Lead   Result Value Ref Range    Ventricular Rate 85 BPM    Atrial Rate 85 BPM    P-R Interval 150 ms    QRS Duration 94 ms    Q-T Interval 394 ms    QTc Calculation (Bazett) 468 ms    P Axis 54 degrees    R Axis 18 degrees    T Axis 52 degrees       RADIOLOGY:  Interpreted by Radiologist.  CTA PULMONARY W CONTRAST   Final Result   No evidence of acute pulmonary embolic disease. Likely old granulomatous   disease as outlined above. EKG:  This EKG is signed and interpreted by the EP. Normal sinus rhythm 85 bpm.  Normal axis. Normal QRS. Nonspecific ST-T wave changes noted. No STEMI.    ------------------------- NURSING NOTES AND VITALS REVIEWED ---------------------------   The nursing notes within the ED encounter and vital signs as below have been reviewed by myself.   /74   Pulse 88   Temp 98.5 °F (36.9 °C) (Tympanic)   Resp 16   Ht 5' 4\" (1.626 m)   Wt 280 lb (127 kg)   LMP 06/23/2018   SpO2 (S) 97% Comment: awake  BMI 48.06 kg/m²   Oxygen Saturation Interpretation: Normal    The patients available past medical records and past encounters were reviewed. ------------------------------ ED COURSE/MEDICAL DECISION MAKING----------------------  Medications   iopamidol (ISOVUE-370) 76 % injection 90 mL (90 mLs Intravenous Given 6/10/21 8960)   ipratropium-albuterol (DUONEB) nebulizer solution 1 ampule (1 ampule Inhalation Given 6/10/21 5648)         ED COURSE:       Medical Decision Making: This is a 58-year-old female presents to the ED for shortness of breath. Patient underwent laboratory work-up which showed a normal CBC. Negative Covid test.  Chemistry showed BUN/creatinine 29/1.1 with glucose of 170. BNP negative. Troponin was 10. EKG showed no ischemic findings. CTA showed no PE just granulomatous disease. Upon ambulation the patient does have hypoxia and drops her oxygen saturations into the 80s. Therefore the patient will be admitted for further work-up. Case discussed with hospitalist who is agreed to admit the patient. I, Dr. Tommy Nowak, am the primary provider for this encounter    This patient's ED course included: a personal history and physicial examination, re-evaluation prior to disposition, multiple bedside re-evaluations, IV medications, cardiac monitoring and continuous pulse oximetry    This patient has remained hemodynamically stable during their ED course. Re-Evaluations:             Re-evaluation. Patients symptoms are improving      Counseling: The emergency provider has spoken with the patient and discussed todays results, in addition to providing specific details for the plan of care and counseling regarding the diagnosis and prognosis. Questions are answered at this time and they are agreeable with the plan.       --------------------------------- IMPRESSION AND DISPOSITION ---------------------------------    IMPRESSION  1.  Acute respiratory failure with hypoxia ([facility] [address]        DISPOSITION  Disposition: Admit to telemetry  Patient condition is stable    NOTE: This report was transcribed using voice recognition software.  Every effort was made to ensure accuracy; however, inadvertent computerized transcription errors may be present        Lorne Villareal DO  06/10/21 6836

## 2021-06-10 NOTE — RT PROTOCOL NOTE
RT Nebulizer Bronchodilator Protocol Note    There is a bronchodilator order in the chart from a provider indicating to follow the RT Bronchodilator Protocol and there is an Initiate RT Bronchodilator Protocol order as well (see protocol at bottom of note). The findings from the last RT Protocol Assessment were as follows:  Smoking: Non smoker  Surgical Status: No surgery  Xray: One lobe infiltrates/atelectasis/pleural effusion/edema  Respiratory Pattern: RR 12-20  Mental Status: Alert and Oriented  Breath Sounds: Clear  Cough: Strong, spontaneous, non-productive  Activity Level: Walking unassisted  Oxygen Requirement: Room Air - 2LNC/28% or home setting  Indication for Bronchodilator Therapy:  (SOB on exertion)  Bronchodilator Assessment Score: 2    Aerosolized bronchodilator medication orders have been revised according to the RT Bronchodilator Protocol. RT Bronchodilator Protocol:    Respiratory Therapist to perform RT Therapy Protocol Assessment then follow the protocol. No Indications - adjust the frequency to every 6 hours PRN wheezing or bronchospasm, if no treatments needed after 48 hours then discontinue using Per Protocol order mode. If indication present, adjust the RT bronchodilator orders based on the Bronchodilator Assessment Score as follows:    0-6 - enter or revise RT Bronchodilator order to Albuterol Nebulizer order with frequency of every 2 hours PRN for wheezing or increased work of breathing using Per Protocol order mode. Repeat RT Therapy Protocol Assessment as needed. 7-10 - discontinue any other Inpatient aerosolized bronchodilator medication orders and enter or revise two Albuterol Nebulizer orders, one with BID frequency and one with frequency of every 2 hours PRN wheezing or increased work of breathing using Per Protocol order mode. Repeat RT Therapy Protocol Assessment with second treatment then BID and as needed.       11-13 - discontinue any other Inpatient aerosolized bronchodilator medication orders and enter [employer] order with QID frequency and an Albuterol Nebulizer order with frequency of every 2 hours PRN wheezing or increased work of breathing using Per Protocol order mode. Repeat RT Therapy Protocol Assessment with second treatment then QID and as needed. Greater than 13 - discontinue any other Inpatient aerosolized bronchodilator medication orders and enter a DuoNeb Nebulizer order with every 4 hours frequency and an Albuterol Nebulizer order with frequency of every 2 hours PRN wheezing or increased work of breathing using Per Protocol order mode. Repeat RT Therapy Protocol Assessment with second treatment then every 4 hours and as needed. RT to enter RT Home Evaluation for COPD & MDI Assessment order using Per Protocol order mode.     Electronically signed by Yakov Matias RCP on 9/63/3565 at 1:48 PM

## 2021-06-10 NOTE — PROGRESS NOTES
Pt O2 sat dropped to 79% on room air while resting in bed; pt placed on 2L NC and O2 sat now 92% at rest

## 2021-06-10 NOTE — PROGRESS NOTES
Brief IM follow up note:    Patient admitted after 6 am this morning. On floor according to nursing sats drop precipitously when lying on her side even when she stays awake. When watching sats bounced from 88% to 96% just sitting at bedside awake. CT scan shows relatively clear lungs, no infiltrates to my eyes    Lung exam is normal    Awaiting pulmonary opinion on best next step. Also complaining of chronic hip pain.     Johnathon Stallings MD

## 2021-06-11 LAB
ALBUMIN SERPL-MCNC: 3.9 G/DL (ref 3.5–5.2)
ALP BLD-CCNC: 98 U/L (ref 35–104)
ALT SERPL-CCNC: 20 U/L (ref 0–32)
ANION GAP SERPL CALCULATED.3IONS-SCNC: 7 MMOL/L (ref 7–16)
AST SERPL-CCNC: 18 U/L (ref 0–31)
BASOPHILS ABSOLUTE: 0.03 E9/L (ref 0–0.2)
BASOPHILS RELATIVE PERCENT: 0.3 % (ref 0–2)
BILIRUB SERPL-MCNC: 0.4 MG/DL (ref 0–1.2)
BUN BLDV-MCNC: 20 MG/DL (ref 6–20)
CALCIUM SERPL-MCNC: 9 MG/DL (ref 8.6–10.2)
CHLORIDE BLD-SCNC: 97 MMOL/L (ref 98–107)
CO2: 32 MMOL/L (ref 22–29)
CREAT SERPL-MCNC: 0.7 MG/DL (ref 0.5–1)
EOSINOPHILS ABSOLUTE: 0.3 E9/L (ref 0.05–0.5)
EOSINOPHILS RELATIVE PERCENT: 2.8 % (ref 0–6)
GFR AFRICAN AMERICAN: >60
GFR NON-AFRICAN AMERICAN: >60 ML/MIN/1.73
GLUCOSE BLD-MCNC: 134 MG/DL (ref 74–99)
HCT VFR BLD CALC: 44.4 % (ref 34–48)
HEMOGLOBIN: 13.3 G/DL (ref 11.5–15.5)
IMMATURE GRANULOCYTES #: 0.12 E9/L
IMMATURE GRANULOCYTES %: 1.1 % (ref 0–5)
LYMPHOCYTES ABSOLUTE: 1.31 E9/L (ref 1.5–4)
LYMPHOCYTES RELATIVE PERCENT: 12.2 % (ref 20–42)
MCH RBC QN AUTO: 27.7 PG (ref 26–35)
MCHC RBC AUTO-ENTMCNC: 30 % (ref 32–34.5)
MCV RBC AUTO: 92.3 FL (ref 80–99.9)
METER GLUCOSE: 105 MG/DL (ref 74–99)
METER GLUCOSE: 108 MG/DL (ref 74–99)
METER GLUCOSE: 128 MG/DL (ref 74–99)
MONOCYTES ABSOLUTE: 0.69 E9/L (ref 0.1–0.95)
MONOCYTES RELATIVE PERCENT: 6.4 % (ref 2–12)
NEUTROPHILS ABSOLUTE: 8.31 E9/L (ref 1.8–7.3)
NEUTROPHILS RELATIVE PERCENT: 77.2 % (ref 43–80)
PDW BLD-RTO: 15.1 FL (ref 11.5–15)
PLATELET # BLD: 276 E9/L (ref 130–450)
PMV BLD AUTO: 10.1 FL (ref 7–12)
POTASSIUM REFLEX MAGNESIUM: 4.3 MMOL/L (ref 3.5–5)
RBC # BLD: 4.81 E12/L (ref 3.5–5.5)
SODIUM BLD-SCNC: 136 MMOL/L (ref 132–146)
TOTAL PROTEIN: 8.4 G/DL (ref 6.4–8.3)
WBC # BLD: 10.8 E9/L (ref 4.5–11.5)

## 2021-06-11 PROCEDURE — 99232 SBSQ HOSP IP/OBS MODERATE 35: CPT | Performed by: INTERNAL MEDICINE

## 2021-06-11 PROCEDURE — G0378 HOSPITAL OBSERVATION PER HR: HCPCS

## 2021-06-11 PROCEDURE — 96376 TX/PRO/DX INJ SAME DRUG ADON: CPT

## 2021-06-11 PROCEDURE — 6370000000 HC RX 637 (ALT 250 FOR IP): Performed by: INTERNAL MEDICINE

## 2021-06-11 PROCEDURE — 93005 ELECTROCARDIOGRAM TRACING: CPT | Performed by: INTERNAL MEDICINE

## 2021-06-11 PROCEDURE — 6370000000 HC RX 637 (ALT 250 FOR IP): Performed by: FAMILY MEDICINE

## 2021-06-11 PROCEDURE — 2500000003 HC RX 250 WO HCPCS: Performed by: INTERNAL MEDICINE

## 2021-06-11 PROCEDURE — 2580000003 HC RX 258: Performed by: FAMILY MEDICINE

## 2021-06-11 PROCEDURE — 80053 COMPREHEN METABOLIC PANEL: CPT

## 2021-06-11 PROCEDURE — 82962 GLUCOSE BLOOD TEST: CPT

## 2021-06-11 PROCEDURE — 36415 COLL VENOUS BLD VENIPUNCTURE: CPT

## 2021-06-11 PROCEDURE — 85025 COMPLETE CBC W/AUTO DIFF WBC: CPT

## 2021-06-11 PROCEDURE — 96374 THER/PROPH/DIAG INJ IV PUSH: CPT

## 2021-06-11 RX ORDER — TRAMADOL HYDROCHLORIDE 50 MG/1
50 TABLET ORAL EVERY 8 HOURS PRN
Status: DISCONTINUED | OUTPATIENT
Start: 2021-06-11 | End: 2021-06-18 | Stop reason: HOSPADM

## 2021-06-11 RX ADMIN — POTASSIUM CHLORIDE 10 MEQ: 750 TABLET, EXTENDED RELEASE ORAL at 17:22

## 2021-06-11 RX ADMIN — SODIUM CHLORIDE, PRESERVATIVE FREE 10 ML: 5 INJECTION INTRAVENOUS at 10:50

## 2021-06-11 RX ADMIN — POTASSIUM CHLORIDE 10 MEQ: 750 TABLET, EXTENDED RELEASE ORAL at 10:49

## 2021-06-11 RX ADMIN — BUMETANIDE 1 MG: 0.25 INJECTION INTRAMUSCULAR; INTRAVENOUS at 17:22

## 2021-06-11 RX ADMIN — HYDROCODONE BITARTRATE AND ACETAMINOPHEN 1 TABLET: 5; 325 TABLET ORAL at 06:45

## 2021-06-11 RX ADMIN — BUMETANIDE 1 MG: 0.25 INJECTION INTRAMUSCULAR; INTRAVENOUS at 06:45

## 2021-06-11 RX ADMIN — BUMETANIDE 1 MG: 0.25 INJECTION INTRAMUSCULAR; INTRAVENOUS at 12:10

## 2021-06-11 RX ADMIN — FOLIC ACID 1 MG: 1 TABLET ORAL at 10:49

## 2021-06-11 RX ADMIN — LOSARTAN POTASSIUM 100 MG: 50 TABLET, FILM COATED ORAL at 10:50

## 2021-06-11 RX ADMIN — LEVOTHYROXINE SODIUM 25 MCG: 25 TABLET ORAL at 10:49

## 2021-06-11 RX ADMIN — HYDROCODONE BITARTRATE AND ACETAMINOPHEN 1 TABLET: 5; 325 TABLET ORAL at 12:50

## 2021-06-11 RX ADMIN — BUMETANIDE 1 MG: 0.25 INJECTION INTRAMUSCULAR; INTRAVENOUS at 00:05

## 2021-06-11 RX ADMIN — SODIUM CHLORIDE, PRESERVATIVE FREE 10 ML: 5 INJECTION INTRAVENOUS at 21:53

## 2021-06-11 RX ADMIN — TRAMADOL HYDROCHLORIDE 50 MG: 50 TABLET, FILM COATED ORAL at 21:53

## 2021-06-11 RX ADMIN — ASPIRIN 81 MG: 81 TABLET, CHEWABLE ORAL at 10:49

## 2021-06-11 ASSESSMENT — PAIN SCALES - GENERAL
PAINLEVEL_OUTOF10: 6
PAINLEVEL_OUTOF10: 10
PAINLEVEL_OUTOF10: 5
PAINLEVEL_OUTOF10: 6

## 2021-06-11 ASSESSMENT — PAIN DESCRIPTION - PROGRESSION: CLINICAL_PROGRESSION: NOT CHANGED

## 2021-06-11 ASSESSMENT — PAIN DESCRIPTION - LOCATION
LOCATION: LEG
LOCATION: LEG

## 2021-06-11 ASSESSMENT — PAIN DESCRIPTION - PAIN TYPE
TYPE: CHRONIC PAIN
TYPE: CHRONIC PAIN

## 2021-06-11 ASSESSMENT — PAIN DESCRIPTION - ONSET: ONSET: ON-GOING

## 2021-06-11 ASSESSMENT — PAIN DESCRIPTION - ORIENTATION
ORIENTATION: LEFT
ORIENTATION: LEFT

## 2021-06-11 ASSESSMENT — PAIN - FUNCTIONAL ASSESSMENT: PAIN_FUNCTIONAL_ASSESSMENT: ACTIVITIES ARE NOT PREVENTED

## 2021-06-11 ASSESSMENT — PAIN DESCRIPTION - FREQUENCY: FREQUENCY: CONTINUOUS

## 2021-06-11 ASSESSMENT — PAIN DESCRIPTION - DESCRIPTORS: DESCRIPTORS: ACHING;CONSTANT;DISCOMFORT

## 2021-06-11 NOTE — CARE COORDINATION
CM NOTE: Per QFR---Covid negative. CM attempted to reach pt by phone again this afternoon. NA in room. CM spoke with charge nurse Janet Jimenez) who confirms pt is independent & has no needs at this time. States uses O2 intermittently. Currently on RA. CM explained ---WILL NEED O2 TESTING PRIOR TO DISCHARGE to determine if pt qualifies for home oxygen.

## 2021-06-11 NOTE — PROGRESS NOTES
Patient voiced concern to this RN about a potential reaction to the norco she received for pain. The patient was complaining of chest discomfort and generalized warm feeling shortly after taking the norco. Patient did not complain of SOB.

## 2021-06-11 NOTE — CARE COORDINATION
CM NOTE: Per QFR---admitted with hypoxia. Reports being SOB & it worsens with activity. States sat in 80s. +weight gain. Diabetic on oral medication. Continue bumex. Pulmonology on case & following. CM attempted to meet with pt but out of room. CM attempted to speak with pt by phone x2 but NA in room. Per nurse---independent from home & plan is return home at discharge.

## 2021-06-11 NOTE — PROGRESS NOTES
P Quality Flow/Interdisciplinary Rounds Progress Note        Quality Flow Rounds held on June 11, 2021    Disciplines Attending:  Bedside Nurse, ,  and Nursing Unit Leadership    Marlen Barahona was admitted on 6/10/2021  1:43 AM    Anticipated Discharge Date:       Disposition:    Vel Score:  Evl Scale Score: 22    Readmission Risk              Risk of Unplanned Readmission:  0           Discussed patient goal for the day, patient clinical progression, and barriers to discharge.   The following Goal(s) of the Day/Commitment(s) have been identified:  iv bumex q6h      Dago Moreno RN  June 11, 2021

## 2021-06-11 NOTE — PROGRESS NOTES
[facility] Hospitalist   Progress Note    Admitting Date and Time: 6/10/2021  1:43 AM  Admit Dx: Hypoxia [R09.02]    Subjective:    Patient was admitted with Hypoxia [R09.02]. Says that overnight nursing had to tell her to put on oxygen twice for going below 88%    Responding well to bumex. ROS: denies fever, chills, cp, sob, n/v, HA unless stated above.      aspirin  81 mg Oral Daily    folic acid  1 mg Oral Daily    levothyroxine  25 mcg Oral Daily    losartan  100 mg Oral Daily    metFORMIN  500 mg Oral BID WC    potassium chloride  10 mEq Oral BID WC    sodium chloride flush  5-40 mL Intravenous 2 times per day    enoxaparin  40 mg Subcutaneous Daily    insulin lispro  0-12 Units Subcutaneous TID WC    insulin lispro  0-6 Units Subcutaneous Nightly    bumetanide  1 mg Intravenous Q6H     sodium chloride flush, 5-40 mL, PRN  sodium chloride, 25 mL, PRN  ondansetron, 4 mg, Q8H PRN   Or  ondansetron, 4 mg, Q6H PRN  polyethylene glycol, 17 g, Daily PRN  acetaminophen, 650 mg, Q6H PRN   Or  acetaminophen, 650 mg, Q6H PRN  glucose, 15 g, PRN  dextrose, 12.5 g, PRN  glucagon (rDNA), 1 mg, PRN  dextrose, 100 mL/hr, PRN  HYDROcodone 5 mg - acetaminophen, 1 tablet, Q6H PRN  albuterol, 2.5 mg, Q2H PRN         Objective:    /67   Pulse 92   Temp 97.7 °F (36.5 °C) (Oral)   Resp 18   Ht 5' 4\" (1.626 m)   Wt (!) 302 lb (137 kg)   LMP 06/23/2018   SpO2 93%   BMI 51.84 kg/m²   General Appearance: alert and oriented to person, place and time and in no acute distress  Skin: warm and dry  Head: normocephalic and atraumatic  Neck: neck supple and non tender  Pulmonary/Chest: clear to auscultation bilaterally- no wheezes, rales or rhonchi, normal air movement, no respiratory distress  Cardiovascular: normal rate, normal S1 and S2 and no murmur  Abdomen: soft, non-tender, non-distended, obese  Extremities: mild bilateral pretibial edema      Recent Labs     06/10/21  0001 06/11/21  0416  136   K 4.5 4.3   CL 97* 97*   CO2 34* 32*   BUN 29* 20   CREATININE 1.1* 0.7   GLUCOSE 170* 134*   CALCIUM 10.1 9.0       Recent Labs     06/10/21  0001 06/11/21  0413   WBC 9.9 10.8   RBC 5.03 4.81   HGB 14.2 13.3   HCT 45.5 44.4   MCV 90.5 92.3   MCH 28.2 27.7   MCHC 31.2* 30.0*   RDW 15.2* 15.1*    276   MPV 10.1 10.1         Radiology:   CTA PULMONARY W CONTRAST   Final Result   No evidence of acute pulmonary embolic disease. Likely old granulomatous   disease as outlined above. Assessment:    Active Problems:    Hypoxia  Resolved Problems:    * No resolved hospital problems. *      Plan:    1. Hypoxia  Appreciate pulmonary help  Likely multifactorial.    Does think she has fluid on, despite normal BNP so started on bumex. Kidneys are tolerating so far and she has good response. Also weight gain, and less active due to knee and hip pain. Very likely has sleep apnea and testing arranged for outpatient. Does have history of sarcoidosis, but CTA is pretty normal appearing, no infiltrates. No clots. May need nocturnal desat study to really document just how low she is going and whether night time oxygen would be helpful. Continue bumex and watch creatinine closely. 2.  DM  Holding metformin after CTA of lungs. Sugars all under 170. Sliding scale if needed. 3. Hypothyroidism  Continue synthroid.     Electronically signed by Kady Gama MD on 6/11/2021 at 11:46 AM

## 2021-06-12 LAB
ANION GAP SERPL CALCULATED.3IONS-SCNC: 10 MMOL/L (ref 7–16)
BUN BLDV-MCNC: 22 MG/DL (ref 6–20)
CALCIUM SERPL-MCNC: 9.1 MG/DL (ref 8.6–10.2)
CHLORIDE BLD-SCNC: 97 MMOL/L (ref 98–107)
CO2: 32 MMOL/L (ref 22–29)
CREAT SERPL-MCNC: 0.8 MG/DL (ref 0.5–1)
GFR AFRICAN AMERICAN: >60
GFR NON-AFRICAN AMERICAN: >60 ML/MIN/1.73
GLUCOSE BLD-MCNC: 111 MG/DL (ref 74–99)
METER GLUCOSE: 128 MG/DL (ref 74–99)
POTASSIUM REFLEX MAGNESIUM: 4.1 MMOL/L (ref 3.5–5)
SODIUM BLD-SCNC: 139 MMOL/L (ref 132–146)

## 2021-06-12 PROCEDURE — 96372 THER/PROPH/DIAG INJ SC/IM: CPT

## 2021-06-12 PROCEDURE — 99232 SBSQ HOSP IP/OBS MODERATE 35: CPT | Performed by: INTERNAL MEDICINE

## 2021-06-12 PROCEDURE — 96376 TX/PRO/DX INJ SAME DRUG ADON: CPT

## 2021-06-12 PROCEDURE — 2500000003 HC RX 250 WO HCPCS: Performed by: INTERNAL MEDICINE

## 2021-06-12 PROCEDURE — 6370000000 HC RX 637 (ALT 250 FOR IP): Performed by: INTERNAL MEDICINE

## 2021-06-12 PROCEDURE — G0378 HOSPITAL OBSERVATION PER HR: HCPCS

## 2021-06-12 PROCEDURE — 82962 GLUCOSE BLOOD TEST: CPT

## 2021-06-12 PROCEDURE — 6360000002 HC RX W HCPCS: Performed by: FAMILY MEDICINE

## 2021-06-12 PROCEDURE — 80048 BASIC METABOLIC PNL TOTAL CA: CPT

## 2021-06-12 PROCEDURE — 36415 COLL VENOUS BLD VENIPUNCTURE: CPT

## 2021-06-12 PROCEDURE — 6370000000 HC RX 637 (ALT 250 FOR IP): Performed by: FAMILY MEDICINE

## 2021-06-12 PROCEDURE — 2580000003 HC RX 258: Performed by: FAMILY MEDICINE

## 2021-06-12 RX ADMIN — FOLIC ACID 1 MG: 1 TABLET ORAL at 09:10

## 2021-06-12 RX ADMIN — BUMETANIDE 1 MG: 0.25 INJECTION INTRAMUSCULAR; INTRAVENOUS at 00:33

## 2021-06-12 RX ADMIN — BUMETANIDE 1 MG: 0.25 INJECTION INTRAMUSCULAR; INTRAVENOUS at 12:46

## 2021-06-12 RX ADMIN — BUMETANIDE 1 MG: 0.25 INJECTION INTRAMUSCULAR; INTRAVENOUS at 06:10

## 2021-06-12 RX ADMIN — LEVOTHYROXINE SODIUM 25 MCG: 25 TABLET ORAL at 09:10

## 2021-06-12 RX ADMIN — ENOXAPARIN SODIUM 40 MG: 40 INJECTION SUBCUTANEOUS at 09:11

## 2021-06-12 RX ADMIN — TRAMADOL HYDROCHLORIDE 50 MG: 50 TABLET, FILM COATED ORAL at 22:11

## 2021-06-12 RX ADMIN — POTASSIUM CHLORIDE 10 MEQ: 750 TABLET, EXTENDED RELEASE ORAL at 09:10

## 2021-06-12 RX ADMIN — BUMETANIDE 1 MG: 0.25 INJECTION INTRAMUSCULAR; INTRAVENOUS at 18:31

## 2021-06-12 RX ADMIN — POTASSIUM CHLORIDE 10 MEQ: 750 TABLET, EXTENDED RELEASE ORAL at 18:31

## 2021-06-12 RX ADMIN — METFORMIN HYDROCHLORIDE 500 MG: 500 TABLET ORAL at 09:10

## 2021-06-12 RX ADMIN — TRAMADOL HYDROCHLORIDE 50 MG: 50 TABLET, FILM COATED ORAL at 09:10

## 2021-06-12 RX ADMIN — METFORMIN HYDROCHLORIDE 500 MG: 500 TABLET ORAL at 18:30

## 2021-06-12 RX ADMIN — LOSARTAN POTASSIUM 100 MG: 50 TABLET, FILM COATED ORAL at 09:12

## 2021-06-12 RX ADMIN — SODIUM CHLORIDE, PRESERVATIVE FREE 10 ML: 5 INJECTION INTRAVENOUS at 20:06

## 2021-06-12 RX ADMIN — SODIUM CHLORIDE, PRESERVATIVE FREE 10 ML: 5 INJECTION INTRAVENOUS at 09:12

## 2021-06-12 RX ADMIN — ASPIRIN 81 MG: 81 TABLET, CHEWABLE ORAL at 09:10

## 2021-06-12 ASSESSMENT — PAIN SCALES - GENERAL
PAINLEVEL_OUTOF10: 0
PAINLEVEL_OUTOF10: 8
PAINLEVEL_OUTOF10: 8
PAINLEVEL_OUTOF10: 0

## 2021-06-12 ASSESSMENT — PAIN DESCRIPTION - PROGRESSION: CLINICAL_PROGRESSION: NOT CHANGED

## 2021-06-12 NOTE — PLAN OF CARE
Problem: Breathing Pattern - Ineffective:  Goal: Ability to achieve and maintain a regular respiratory rate will improve  Description: Ability to achieve and maintain a regular respiratory rate will improve  Outcome: Met This Shift     Problem: Falls - Risk of:  Goal: Will remain free from falls  Description: Will remain free from falls  Outcome: Met This Shift  Goal: Absence of physical injury  Description: Absence of physical injury  Outcome: Met This Shift     Problem: Pain:  Goal: Pain level will decrease  Description: Pain level will decrease  Outcome: Met This Shift  Goal: Control of acute pain  Description: Control of acute pain  Outcome: Met This Shift  Goal: Control of chronic pain  Description: Control of chronic pain  Outcome: Met This Shift

## 2021-06-12 NOTE — PROGRESS NOTES
Event Note    · Seen in coverage for [facility] pulmonology, chart is reviewed, x-rays examined. The patient is unavailable for exam.  We will check back tomorrow. Shan Calzada. Nasima Arevalo M.D., F.C.C.P.     [facility], [address] [place], [place] [address], [address] [place] [address]

## 2021-06-12 NOTE — PROGRESS NOTES
[facility] Hospital[facility]   Progress Note    Admitting Date and Time: 6/10/2021  1:43 AM  Admit Dx: Hypoxia [R09.02]    Subjective:    Patient was admitted with Hypoxia [R09.02]. Wore oxygen last night and thinks she slept better. Feels she isn't any less swollen. I did lie patient flat on her back on room air and she immediately dropped to 85% - remained mostly 84-88% while on her back, she then lay on her right side and oxygen then 88% mostly. When sitting up she is 89-94%. Interestingly when lying on her side and taking deep breaths for exam her oxygen went up to 96%. Was never symptomatic for this. ROS: denies fever, chills, cp, sob, n/v, HA unless stated above.      aspirin  81 mg Oral Daily    folic acid  1 mg Oral Daily    levothyroxine  25 mcg Oral Daily    losartan  100 mg Oral Daily    metFORMIN  500 mg Oral BID WC    potassium chloride  10 mEq Oral BID WC    sodium chloride flush  5-40 mL Intravenous 2 times per day    enoxaparin  40 mg Subcutaneous Daily    insulin lispro  0-12 Units Subcutaneous TID WC    insulin lispro  0-6 Units Subcutaneous Nightly    bumetanide  1 mg Intravenous Q6H     traMADol, 50 mg, Q8H PRN  sodium chloride flush, 5-40 mL, PRN  sodium chloride, 25 mL, PRN  ondansetron, 4 mg, Q8H PRN   Or  ondansetron, 4 mg, Q6H PRN  polyethylene glycol, 17 g, Daily PRN  acetaminophen, 650 mg, Q6H PRN   Or  acetaminophen, 650 mg, Q6H PRN  glucose, 15 g, PRN  dextrose, 12.5 g, PRN  glucagon (rDNA), 1 mg, PRN  dextrose, 100 mL/hr, PRN  albuterol, 2.5 mg, Q2H PRN         Objective:    /80   Pulse 102   Temp 97.9 °F (36.6 °C) (Oral)   Resp 16   Ht 5' 4\" (1.626 m)   Wt (!) 302 lb (137 kg)   LMP 06/23/2018   SpO2 (!) 88%   BMI 51.84 kg/m²   General Appearance: alert and oriented to person, place and time and in no acute distress  Skin: warm and dry  Head: normocephalic and atraumatic  Neck: neck supple and non tender  Pulmonary/Chest: clear to Hypothyroidism  Continue synthroid.     Electronically signed by Lucila Dunlap MD on 6/12/2021 at 3:20 PM

## 2021-06-12 NOTE — PLAN OF CARE
Problem: Breathing Pattern - Ineffective:  Goal: Ability to achieve and maintain a regular respiratory rate will improve  Description: Ability to achieve and maintain a regular respiratory rate will improve  6/12/2021 1101 by Josey Hawthorne RN  Outcome: Met This Shift  6/12/2021 0038 by Cheyenne Cam RN  Outcome: Met This Shift     Problem: Falls - Risk of:  Goal: Will remain free from falls  Description: Will remain free from falls  6/12/2021 1101 by Josey Hawthorne RN  Outcome: Met This Shift  6/12/2021 0038 by Cheyenne aCm RN  Outcome: Met This Shift  Goal: Absence of physical injury  Description: Absence of physical injury  6/12/2021 1101 by Josey Hawthorne RN  Outcome: Met This Shift  6/12/2021 0038 by Cheyenne Cam RN  Outcome: Met This Shift     Problem: Pain:  Goal: Pain level will decrease  Description: Pain level will decrease  6/12/2021 1101 by Josey Hawthorne RN  Outcome: Met This Shift  6/12/2021 0038 by Cheyenne Cam RN  Outcome: Met This Shift  Goal: Control of acute pain  Description: Control of acute pain  6/12/2021 1101 by Josey Hawthorne RN  Outcome: Met This Shift  6/12/2021 0038 by Cheyenne Cam RN  Outcome: Met This Shift  Goal: Control of chronic pain  Description: Control of chronic pain  6/12/2021 1101 by Josey Hawthorne RN  Outcome: Met This Shift  6/12/2021 0038 by Cheyenne Cam RN  Outcome: Met This Shift

## 2021-06-12 NOTE — PROGRESS NOTES
Dr. Bay Garcia notified of patients allergy concern regarding [employer] order received for Tramadol prn. [place] discontinued.

## 2021-06-12 NOTE — PROGRESS NOTES
Bmp reviewed  Negative 1 L   Will increase Bumex dose and use drip ,if not negative another 1-2 L next 24 h   If creatinine goes up will cut back on diuresis

## 2021-06-13 LAB
ANION GAP SERPL CALCULATED.3IONS-SCNC: 10 MMOL/L (ref 7–16)
BUN BLDV-MCNC: 28 MG/DL (ref 6–20)
CALCIUM SERPL-MCNC: 9.4 MG/DL (ref 8.6–10.2)
CHLORIDE BLD-SCNC: 93 MMOL/L (ref 98–107)
CO2: 33 MMOL/L (ref 22–29)
CREAT SERPL-MCNC: 0.9 MG/DL (ref 0.5–1)
GFR AFRICAN AMERICAN: >60
GFR NON-AFRICAN AMERICAN: >60 ML/MIN/1.73
GLUCOSE BLD-MCNC: 141 MG/DL (ref 74–99)
METER GLUCOSE: 113 MG/DL (ref 74–99)
METER GLUCOSE: 123 MG/DL (ref 74–99)
METER GLUCOSE: 141 MG/DL (ref 74–99)
POTASSIUM REFLEX MAGNESIUM: 4.3 MMOL/L (ref 3.5–5)
SODIUM BLD-SCNC: 136 MMOL/L (ref 132–146)

## 2021-06-13 PROCEDURE — 6360000002 HC RX W HCPCS: Performed by: FAMILY MEDICINE

## 2021-06-13 PROCEDURE — 99232 SBSQ HOSP IP/OBS MODERATE 35: CPT | Performed by: INTERNAL MEDICINE

## 2021-06-13 PROCEDURE — 36415 COLL VENOUS BLD VENIPUNCTURE: CPT

## 2021-06-13 PROCEDURE — 6370000000 HC RX 637 (ALT 250 FOR IP): Performed by: FAMILY MEDICINE

## 2021-06-13 PROCEDURE — 6370000000 HC RX 637 (ALT 250 FOR IP): Performed by: INTERNAL MEDICINE

## 2021-06-13 PROCEDURE — 96372 THER/PROPH/DIAG INJ SC/IM: CPT

## 2021-06-13 PROCEDURE — 82962 GLUCOSE BLOOD TEST: CPT

## 2021-06-13 PROCEDURE — 96376 TX/PRO/DX INJ SAME DRUG ADON: CPT

## 2021-06-13 PROCEDURE — G0378 HOSPITAL OBSERVATION PER HR: HCPCS

## 2021-06-13 PROCEDURE — 80048 BASIC METABOLIC PNL TOTAL CA: CPT

## 2021-06-13 PROCEDURE — 2580000003 HC RX 258: Performed by: FAMILY MEDICINE

## 2021-06-13 PROCEDURE — 2700000000 HC OXYGEN THERAPY PER DAY

## 2021-06-13 PROCEDURE — 2500000003 HC RX 250 WO HCPCS: Performed by: INTERNAL MEDICINE

## 2021-06-13 RX ADMIN — POTASSIUM CHLORIDE 10 MEQ: 750 TABLET, EXTENDED RELEASE ORAL at 17:06

## 2021-06-13 RX ADMIN — LEVOTHYROXINE SODIUM 25 MCG: 25 TABLET ORAL at 08:44

## 2021-06-13 RX ADMIN — BUMETANIDE 1 MG: 0.25 INJECTION INTRAMUSCULAR; INTRAVENOUS at 00:35

## 2021-06-13 RX ADMIN — BUMETANIDE 1 MG: 0.25 INJECTION INTRAMUSCULAR; INTRAVENOUS at 13:15

## 2021-06-13 RX ADMIN — BUMETANIDE 1 MG: 0.25 INJECTION INTRAMUSCULAR; INTRAVENOUS at 23:56

## 2021-06-13 RX ADMIN — ASPIRIN 81 MG: 81 TABLET, CHEWABLE ORAL at 08:44

## 2021-06-13 RX ADMIN — FOLIC ACID 1 MG: 1 TABLET ORAL at 08:44

## 2021-06-13 RX ADMIN — METFORMIN HYDROCHLORIDE 500 MG: 500 TABLET ORAL at 08:44

## 2021-06-13 RX ADMIN — LOSARTAN POTASSIUM 100 MG: 50 TABLET, FILM COATED ORAL at 08:44

## 2021-06-13 RX ADMIN — BUMETANIDE 1 MG: 0.25 INJECTION INTRAMUSCULAR; INTRAVENOUS at 18:18

## 2021-06-13 RX ADMIN — ENOXAPARIN SODIUM 40 MG: 40 INJECTION SUBCUTANEOUS at 08:48

## 2021-06-13 RX ADMIN — TRAMADOL HYDROCHLORIDE 50 MG: 50 TABLET, FILM COATED ORAL at 17:06

## 2021-06-13 RX ADMIN — METFORMIN HYDROCHLORIDE 500 MG: 500 TABLET ORAL at 17:06

## 2021-06-13 RX ADMIN — POTASSIUM CHLORIDE 10 MEQ: 750 TABLET, EXTENDED RELEASE ORAL at 08:44

## 2021-06-13 RX ADMIN — BUMETANIDE 1 MG: 0.25 INJECTION INTRAMUSCULAR; INTRAVENOUS at 07:37

## 2021-06-13 RX ADMIN — SODIUM CHLORIDE, PRESERVATIVE FREE 10 ML: 5 INJECTION INTRAVENOUS at 08:45

## 2021-06-13 ASSESSMENT — PAIN SCALES - GENERAL
PAINLEVEL_OUTOF10: 0
PAINLEVEL_OUTOF10: 9
PAINLEVEL_OUTOF10: 0

## 2021-06-13 ASSESSMENT — PAIN DESCRIPTION - PROGRESSION: CLINICAL_PROGRESSION: NOT CHANGED

## 2021-06-13 NOTE — PROGRESS NOTES
[facility]   Progress Note    Admitting Date and Time: 6/10/2021  1:43 AM  Admit Dx: Hypoxia [R09.02]    Subjective:    Patient was admitted with Hypoxia [R09.02]. No complaints other than ankles and feet remain very swollen. Said when she was given lasix it \"worked better\"    According to nursing sats dropped to 85% while she was just sitting in the chair wide awake doing nothing. Patient had no symptoms at all at the time. ROS: denies fever, chills, cp, sob, n/v, HA unless stated above.      aspirin  81 mg Oral Daily    folic acid  1 mg Oral Daily    levothyroxine  25 mcg Oral Daily    losartan  100 mg Oral Daily    metFORMIN  500 mg Oral BID WC    potassium chloride  10 mEq Oral BID WC    sodium chloride flush  5-40 mL Intravenous 2 times per day    enoxaparin  40 mg Subcutaneous Daily    insulin lispro  0-12 Units Subcutaneous TID WC    insulin lispro  0-6 Units Subcutaneous Nightly    bumetanide  1 mg Intravenous Q6H     traMADol, 50 mg, Q8H PRN  sodium chloride flush, 5-40 mL, PRN  sodium chloride, 25 mL, PRN  ondansetron, 4 mg, Q8H PRN   Or  ondansetron, 4 mg, Q6H PRN  polyethylene glycol, 17 g, Daily PRN  acetaminophen, 650 mg, Q6H PRN   Or  acetaminophen, 650 mg, Q6H PRN  glucose, 15 g, PRN  dextrose, 12.5 g, PRN  glucagon (rDNA), 1 mg, PRN  dextrose, 100 mL/hr, PRN  albuterol, 2.5 mg, Q2H PRN         Objective:    /60   Pulse 86   Temp 98.4 °F (36.9 °C) (Oral)   Resp 18   Ht 5' 4\" (1.626 m)   Wt 299 lb 9.6 oz (135.9 kg)   LMP 06/23/2018   SpO2 92%   BMI 51.43 kg/m²   General Appearance: alert and oriented to person, place and time and in no acute distress  Skin: warm and dry  Head: normocephalic and atraumatic  Neck: neck supple and non tender  Pulmonary/Chest: clear to auscultation bilaterally- no wheezes, rales or rhonchi, normal air movement, no respiratory distress  Cardiovascular: normal rate, normal S1 and S2 and no murmur  Abdomen: soft, non-tender, non-distended, obese  Extremities: mild bilateral pretibial edema, ankle and foot with 2+      Recent Labs     06/11/21  0413 06/12/21  0615 06/13/21  0310    139 136   K 4.3 4.1 4.3   CL 97* 97* 93*   CO2 32* 32* 33*   BUN 20 22* 28*   CREATININE 0.7 0.8 0.9   GLUCOSE 134* 111* 141*   CALCIUM 9.0 9.1 9.4       Recent Labs     06/11/21  0413   WBC 10.8   RBC 4.81   HGB 13.3   HCT 44.4   MCV 92.3   MCH 27.7   MCHC 30.0*   RDW 15.1*      MPV 10.1       Radiology:   CTA PULMONARY W CONTRAST   Final Result   No evidence of acute pulmonary embolic disease. Likely old granulomatous   disease as outlined above. Assessment:    Active Problems:    Hypoxia  Resolved Problems:    * No resolved hospital problems. *      Plan:  1. Hypoxia  Appreciate pulmonary help  Likely multifactorial.     Does think she has fluid on, despite normal BNP so started on bumex.  Kidneys are tolerating so far and she has fair response - 4200 cc's off since admission. Patient herself doesn't feel like legs are much improved, and BUN is starting to push. 1 more day diuresis and will review with pulmonary.     Very likely has sleep apnea as well and testing arranged for outpatient.     Does have history of sarcoidosis, but CTA is pretty normal appearing, no infiltrates.  No clots.     It is interesting that she immediately desaturates when lying flat and this may be to redistribution of blood flow to areas of lung that do not oxygenate as well. It is possible that CPAP or BiPAP fixes this as well as her KISHORE.     Will continue diuresis, but if she is still very hypoxic when flat on her back may need to document need for O2 at night at home. Will wear oxygen here at night when trying to sleep.     2.  DM  Metformin restarted after CTA of lungs. Sugars control is good. Sliding scale if needed.     3. Hypothyroidism  Continue synthroid.     Electronically signed by Hilary Isbell MD on 6/13/2021 at 2:26 PM

## 2021-06-14 LAB
ALBUMIN SERPL-MCNC: 3.8 G/DL (ref 3.5–5.2)
ALP BLD-CCNC: 100 U/L (ref 35–104)
ALT SERPL-CCNC: 21 U/L (ref 0–32)
ANION GAP SERPL CALCULATED.3IONS-SCNC: 8 MMOL/L (ref 7–16)
AST SERPL-CCNC: 19 U/L (ref 0–31)
BILIRUB SERPL-MCNC: 0.4 MG/DL (ref 0–1.2)
BUN BLDV-MCNC: 24 MG/DL (ref 6–20)
CALCIUM SERPL-MCNC: 9.7 MG/DL (ref 8.6–10.2)
CHLORIDE BLD-SCNC: 92 MMOL/L (ref 98–107)
CO2: 34 MMOL/L (ref 22–29)
CREAT SERPL-MCNC: 0.7 MG/DL (ref 0.5–1)
EKG ATRIAL RATE: 86 BPM
EKG P AXIS: 47 DEGREES
EKG P-R INTERVAL: 164 MS
EKG Q-T INTERVAL: 378 MS
EKG QRS DURATION: 102 MS
EKG QTC CALCULATION (BAZETT): 452 MS
EKG R AXIS: 24 DEGREES
EKG T AXIS: 9 DEGREES
EKG VENTRICULAR RATE: 86 BPM
GFR AFRICAN AMERICAN: >60
GFR NON-AFRICAN AMERICAN: >60 ML/MIN/1.73
GLUCOSE BLD-MCNC: 116 MG/DL (ref 74–99)
POTASSIUM SERPL-SCNC: 4.1 MMOL/L (ref 3.5–5)
SODIUM BLD-SCNC: 134 MMOL/L (ref 132–146)
TOTAL PROTEIN: 8.4 G/DL (ref 6.4–8.3)

## 2021-06-14 PROCEDURE — 93010 ELECTROCARDIOGRAM REPORT: CPT | Performed by: INTERNAL MEDICINE

## 2021-06-14 PROCEDURE — 6370000000 HC RX 637 (ALT 250 FOR IP): Performed by: INTERNAL MEDICINE

## 2021-06-14 PROCEDURE — 36415 COLL VENOUS BLD VENIPUNCTURE: CPT

## 2021-06-14 PROCEDURE — G0378 HOSPITAL OBSERVATION PER HR: HCPCS

## 2021-06-14 PROCEDURE — 2500000003 HC RX 250 WO HCPCS: Performed by: INTERNAL MEDICINE

## 2021-06-14 PROCEDURE — 6360000002 HC RX W HCPCS: Performed by: FAMILY MEDICINE

## 2021-06-14 PROCEDURE — 80053 COMPREHEN METABOLIC PANEL: CPT

## 2021-06-14 PROCEDURE — 99232 SBSQ HOSP IP/OBS MODERATE 35: CPT | Performed by: INTERNAL MEDICINE

## 2021-06-14 PROCEDURE — 96376 TX/PRO/DX INJ SAME DRUG ADON: CPT

## 2021-06-14 PROCEDURE — 2580000003 HC RX 258: Performed by: FAMILY MEDICINE

## 2021-06-14 PROCEDURE — 96372 THER/PROPH/DIAG INJ SC/IM: CPT

## 2021-06-14 PROCEDURE — 6370000000 HC RX 637 (ALT 250 FOR IP): Performed by: FAMILY MEDICINE

## 2021-06-14 RX ADMIN — BUMETANIDE 1 MG: 0.25 INJECTION INTRAMUSCULAR; INTRAVENOUS at 19:13

## 2021-06-14 RX ADMIN — BUMETANIDE 1 MG: 0.25 INJECTION INTRAMUSCULAR; INTRAVENOUS at 13:47

## 2021-06-14 RX ADMIN — SODIUM CHLORIDE, PRESERVATIVE FREE 10 ML: 5 INJECTION INTRAVENOUS at 20:04

## 2021-06-14 RX ADMIN — POTASSIUM CHLORIDE 10 MEQ: 750 TABLET, EXTENDED RELEASE ORAL at 08:41

## 2021-06-14 RX ADMIN — POTASSIUM CHLORIDE 10 MEQ: 750 TABLET, EXTENDED RELEASE ORAL at 17:10

## 2021-06-14 RX ADMIN — FOLIC ACID 1 MG: 1 TABLET ORAL at 08:40

## 2021-06-14 RX ADMIN — METFORMIN HYDROCHLORIDE 500 MG: 500 TABLET ORAL at 08:41

## 2021-06-14 RX ADMIN — SODIUM CHLORIDE, PRESERVATIVE FREE 10 ML: 5 INJECTION INTRAVENOUS at 08:41

## 2021-06-14 RX ADMIN — METFORMIN HYDROCHLORIDE 500 MG: 500 TABLET ORAL at 17:10

## 2021-06-14 RX ADMIN — ACETAMINOPHEN 650 MG: 325 TABLET ORAL at 04:23

## 2021-06-14 RX ADMIN — BUMETANIDE 1 MG: 0.25 INJECTION INTRAMUSCULAR; INTRAVENOUS at 05:51

## 2021-06-14 RX ADMIN — LEVOTHYROXINE SODIUM 25 MCG: 25 TABLET ORAL at 08:40

## 2021-06-14 RX ADMIN — ENOXAPARIN SODIUM 40 MG: 40 INJECTION SUBCUTANEOUS at 08:41

## 2021-06-14 RX ADMIN — LOSARTAN POTASSIUM 100 MG: 50 TABLET, FILM COATED ORAL at 08:40

## 2021-06-14 RX ADMIN — ASPIRIN 81 MG: 81 TABLET, CHEWABLE ORAL at 08:41

## 2021-06-14 RX ADMIN — TRAMADOL HYDROCHLORIDE 50 MG: 50 TABLET, FILM COATED ORAL at 01:06

## 2021-06-14 RX ADMIN — BUMETANIDE 1 MG: 0.25 INJECTION INTRAMUSCULAR; INTRAVENOUS at 23:39

## 2021-06-14 ASSESSMENT — PAIN SCALES - GENERAL
PAINLEVEL_OUTOF10: 3
PAINLEVEL_OUTOF10: 0
PAINLEVEL_OUTOF10: 5

## 2021-06-14 NOTE — PROGRESS NOTES
Pt O2 dropped to 59% on room air while asleep; instructed patient that she must keep O2 on while lying flat and/or sleeping.   2L NC reapplied to pt; O2 now 91% on 2L

## 2021-06-14 NOTE — PROGRESS NOTES
[facility] Hospitalist   Progress Note    Admitting Date and Time: 6/10/2021  1:43 AM  Admit Dx: Hypoxia [R09.02]    Subjective:    Patient was admitted with Hypoxia [R09.02]. Still thinks ankles and feet are too swollen, but always sits upright with feet dangling down. Not short of breath. Apparently today fell asleep on her back without oxygen and sats dropped into 50's! ROS: denies fever, chills, cp, sob, n/v, HA unless stated above.      aspirin  81 mg Oral Daily    folic acid  1 mg Oral Daily    levothyroxine  25 mcg Oral Daily    losartan  100 mg Oral Daily    metFORMIN  500 mg Oral BID WC    potassium chloride  10 mEq Oral BID WC    sodium chloride flush  5-40 mL Intravenous 2 times per day    enoxaparin  40 mg Subcutaneous Daily    insulin lispro  0-12 Units Subcutaneous TID WC    insulin lispro  0-6 Units Subcutaneous Nightly    bumetanide  1 mg Intravenous Q6H     traMADol, 50 mg, Q8H PRN  sodium chloride flush, 5-40 mL, PRN  sodium chloride, 25 mL, PRN  ondansetron, 4 mg, Q8H PRN   Or  ondansetron, 4 mg, Q6H PRN  polyethylene glycol, 17 g, Daily PRN  acetaminophen, 650 mg, Q6H PRN   Or  acetaminophen, 650 mg, Q6H PRN  glucose, 15 g, PRN  dextrose, 12.5 g, PRN  glucagon (rDNA), 1 mg, PRN  dextrose, 100 mL/hr, PRN  albuterol, 2.5 mg, Q2H PRN         Objective:    BP (!) 108/51   Pulse 78   Temp 97.7 °F (36.5 °C) (Oral)   Resp 17   Ht 5' 4\" (1.626 m)   Wt 299 lb 9.6 oz (135.9 kg)   LMP 06/23/2018   SpO2 92%   BMI 51.43 kg/m²   General Appearance: alert and oriented to person, place and time and in no acute distress  Skin: warm and dry  Head: normocephalic and atraumatic  Neck: neck supple and non tender  Pulmonary/Chest: clear to auscultation bilaterally- no wheezes, rales or rhonchi, normal air movement, no respiratory distress  Cardiovascular: normal rate, normal S1 and S2 and no murmur  Abdomen: soft, non-tender, non-distended, obese  Extremities: mild bilateral pretibial edema, ankle and foot with 2+      Recent Labs     06/12/21  0615 06/13/21  0310 06/14/21  1045    136 134   K 4.1 4.3 4.1   CL 97* 93* 92*   CO2 32* 33* 34*   BUN 22* 28* 24*   CREATININE 0.8 0.9 0.7   GLUCOSE 111* 141* 116*   CALCIUM 9.1 9.4 9.7       No results for input(s): WBC, RBC, HGB, HCT, MCV, MCH, MCHC, RDW, PLT, MPV in the last 72 hours. Radiology:   CTA PULMONARY W CONTRAST   Final Result   No evidence of acute pulmonary embolic disease. Likely old granulomatous   disease as outlined above. Assessment:    Active Problems:    Hypoxia  Resolved Problems:    * No resolved hospital problems. *      Plan:    1. Hypoxia  Appreciate pulmonary help  Likely multifactorial.     Pulmonary does think she has fluid on, despite normal BNP so started on bumex.  Kidneys are tolerating so far and she has fair response - 7440 cc's off since admission recorded.     Patient herself doesn't feel like legs are much improved, and BUN is starting to push, also some contraction alkalosis, but creatinine is fine.     Very likely has sleep apnea as well and testing arranged for outpatient.     Does have history of sarcoidosis, but CTA is pretty normal appearing, no infiltrates.  No clots.     It is interesting that she immediately desaturates when lying flat and this may be to redistribution of blood flow to areas of lung that do not oxygenate as well.  It is possible that CPAP or BiPAP fixes this as well as her KISHORE.     Will continue diuresis, but if she is still very hypoxic when flat on her back. I did order overnight sleep oximetry, hopefully with record of overnight hypoxia can at least get her some home O2 until she does her sleep study.     2.  DM  Metformin restarted after CTA of lungs. Sugars control is good. Sliding scale if needed.     3. Hypothyroidism  Continue synthroid.     Electronically signed by Pollo Ray MD on 6/14/2021 at 6:59 PM

## 2021-06-14 NOTE — CARE COORDINATION
CASE MANAGEMENT. .. Chart reviewed. Noted that cm attempted to meet with patient on Friday 6/11 to complete assessment, but was unable to. Met with patient in her room. She is resting in bed with o2 on 2lnc and continues on iv bumex 1mg q 6hrs. Patient voices living with her daughter and is independent at home. Denies any dme/cesar/hhc. Was explaining to her that nursing will be assessing need for home o2. She became irritated with me. States the doctor told her she needs to get her rest and to wear this oxygen when she lays down. I proceeded to explain to her that if she will require any home o2 that testing is still required in order for insurance to approve. Certain criteria is needed. She was still irritated with me. Said I was rude and she does not want to speak with me anymore. Her direct care nurse and ss was notified of the above and they will cont to follow along with me.

## 2021-06-14 NOTE — PLAN OF CARE
Problem: Breathing Pattern - Ineffective:  Goal: Ability to achieve and maintain a regular respiratory rate will improve  Description: Ability to achieve and maintain a regular respiratory rate will improve  Outcome: Ongoing     Problem: Falls - Risk of:  Goal: Will remain free from falls  Description: Will remain free from falls  Outcome: Met This Shift  Goal: Absence of physical injury  Description: Absence of physical injury  Outcome: Met This Shift     Problem: Pain:  Description: Pain management should include both nonpharmacologic and pharmacologic interventions.   Goal: Pain level will decrease  Description: Pain level will decrease  Outcome: Met This Shift  Goal: Control of acute pain  Description: Control of acute pain  Outcome: Met This Shift  Goal: Control of chronic pain  Description: Control of chronic pain  Outcome: Met This Shift

## 2021-06-14 NOTE — PROGRESS NOTES
Pulse ox was_94___% on room air at rest.  Ambulated patient on room air. Oxygen saturation was __91___% on room air while ambulating. Oxygen was NOT applied. Recovery pulse ox was __92___% on __0___liters of oxygen while ambulating.

## 2021-06-14 NOTE — PROGRESS NOTES
[facility] Quality Flow/Interdisciplinary Rounds Progress Note        Quality Flow Rounds held on June 14, 2021    Disciplines Attending:  Bedside Nurse, ,  and Nursing Unit Leadership    Bernabe Salazar was admitted on 6/10/2021  1:43 AM    Anticipated Discharge Date:  Expected Discharge Date: 06/18/21    Disposition:    Vel Score:  Vel Scale Score: 23    Readmission Risk              Risk of Unplanned Readmission:  0           Discussed patient goal for the day, patient clinical progression, and barriers to discharge.   The following Goal(s) of the Day/Commitment(s) have been identified:  ambulate SpO2 , check for d/c today      Boaz Green RN  June 14, 2021

## 2021-06-15 LAB
ANION GAP SERPL CALCULATED.3IONS-SCNC: 11 MMOL/L (ref 7–16)
BLOOD CULTURE, ROUTINE: NORMAL
BUN BLDV-MCNC: 20 MG/DL (ref 6–20)
CALCIUM SERPL-MCNC: 9.7 MG/DL (ref 8.6–10.2)
CHLORIDE BLD-SCNC: 95 MMOL/L (ref 98–107)
CO2: 30 MMOL/L (ref 22–29)
CREAT SERPL-MCNC: 0.7 MG/DL (ref 0.5–1)
CULTURE, BLOOD 2: NORMAL
GFR AFRICAN AMERICAN: >60
GFR NON-AFRICAN AMERICAN: >60 ML/MIN/1.73
GLUCOSE BLD-MCNC: 194 MG/DL (ref 74–99)
METER GLUCOSE: 128 MG/DL (ref 74–99)
POTASSIUM REFLEX MAGNESIUM: 4.1 MMOL/L (ref 3.5–5)
SODIUM BLD-SCNC: 136 MMOL/L (ref 132–146)

## 2021-06-15 PROCEDURE — 96375 TX/PRO/DX INJ NEW DRUG ADDON: CPT

## 2021-06-15 PROCEDURE — 2060000000 HC ICU INTERMEDIATE R&B

## 2021-06-15 PROCEDURE — 2500000003 HC RX 250 WO HCPCS: Performed by: INTERNAL MEDICINE

## 2021-06-15 PROCEDURE — 82962 GLUCOSE BLOOD TEST: CPT

## 2021-06-15 PROCEDURE — 96376 TX/PRO/DX INJ SAME DRUG ADON: CPT

## 2021-06-15 PROCEDURE — 36415 COLL VENOUS BLD VENIPUNCTURE: CPT

## 2021-06-15 PROCEDURE — 6370000000 HC RX 637 (ALT 250 FOR IP): Performed by: INTERNAL MEDICINE

## 2021-06-15 PROCEDURE — 6370000000 HC RX 637 (ALT 250 FOR IP): Performed by: FAMILY MEDICINE

## 2021-06-15 PROCEDURE — 99254 IP/OBS CNSLTJ NEW/EST MOD 60: CPT | Performed by: INTERNAL MEDICINE

## 2021-06-15 PROCEDURE — 6360000002 HC RX W HCPCS: Performed by: FAMILY MEDICINE

## 2021-06-15 PROCEDURE — 96372 THER/PROPH/DIAG INJ SC/IM: CPT

## 2021-06-15 PROCEDURE — 99232 SBSQ HOSP IP/OBS MODERATE 35: CPT | Performed by: INTERNAL MEDICINE

## 2021-06-15 PROCEDURE — 80048 BASIC METABOLIC PNL TOTAL CA: CPT

## 2021-06-15 PROCEDURE — 94762 N-INVAS EAR/PLS OXIMTRY CONT: CPT

## 2021-06-15 PROCEDURE — 2700000000 HC OXYGEN THERAPY PER DAY

## 2021-06-15 PROCEDURE — 2580000003 HC RX 258: Performed by: FAMILY MEDICINE

## 2021-06-15 RX ADMIN — BUMETANIDE 1 MG: 0.25 INJECTION INTRAMUSCULAR; INTRAVENOUS at 05:53

## 2021-06-15 RX ADMIN — BUMETANIDE 1 MG: 0.25 INJECTION INTRAMUSCULAR; INTRAVENOUS at 12:16

## 2021-06-15 RX ADMIN — POTASSIUM CHLORIDE 10 MEQ: 750 TABLET, EXTENDED RELEASE ORAL at 16:52

## 2021-06-15 RX ADMIN — POTASSIUM CHLORIDE 10 MEQ: 750 TABLET, EXTENDED RELEASE ORAL at 08:20

## 2021-06-15 RX ADMIN — LOSARTAN POTASSIUM 100 MG: 50 TABLET, FILM COATED ORAL at 08:20

## 2021-06-15 RX ADMIN — FOLIC ACID 1 MG: 1 TABLET ORAL at 08:20

## 2021-06-15 RX ADMIN — BUMETANIDE 1 MG: 0.25 INJECTION INTRAMUSCULAR; INTRAVENOUS at 23:39

## 2021-06-15 RX ADMIN — METFORMIN HYDROCHLORIDE 500 MG: 500 TABLET ORAL at 16:52

## 2021-06-15 RX ADMIN — ENOXAPARIN SODIUM 40 MG: 40 INJECTION SUBCUTANEOUS at 08:20

## 2021-06-15 RX ADMIN — ACETAMINOPHEN 650 MG: 325 TABLET ORAL at 23:42

## 2021-06-15 RX ADMIN — LEVOTHYROXINE SODIUM 25 MCG: 25 TABLET ORAL at 08:20

## 2021-06-15 RX ADMIN — METFORMIN HYDROCHLORIDE 500 MG: 500 TABLET ORAL at 08:20

## 2021-06-15 RX ADMIN — ONDANSETRON 4 MG: 2 INJECTION INTRAMUSCULAR; INTRAVENOUS at 08:20

## 2021-06-15 RX ADMIN — TRAMADOL HYDROCHLORIDE 50 MG: 50 TABLET, FILM COATED ORAL at 16:52

## 2021-06-15 RX ADMIN — SODIUM CHLORIDE, PRESERVATIVE FREE 10 ML: 5 INJECTION INTRAVENOUS at 23:39

## 2021-06-15 RX ADMIN — ASPIRIN 81 MG: 81 TABLET, CHEWABLE ORAL at 10:37

## 2021-06-15 RX ADMIN — BUMETANIDE 1 MG: 0.25 INJECTION INTRAMUSCULAR; INTRAVENOUS at 16:52

## 2021-06-15 ASSESSMENT — PAIN SCALES - GENERAL
PAINLEVEL_OUTOF10: 3
PAINLEVEL_OUTOF10: 0
PAINLEVEL_OUTOF10: 10
PAINLEVEL_OUTOF10: 5
PAINLEVEL_OUTOF10: 0

## 2021-06-15 NOTE — PROGRESS NOTES
[facility]   Progress Note    Admitting Date and Time: 6/10/2021  1:43 AM  Admit Dx: Hypoxia [R09.02]    Subjective:    Patient was admitted with Hypoxia [R09.02]. Patient the same, did not get overnight oximetry study done. Thinks ankles and feet less swollen but she was recently out of bed. ROS: denies fever, chills, cp, sob, n/v, HA unless stated above.      aspirin  81 mg Oral Daily    folic acid  1 mg Oral Daily    levothyroxine  25 mcg Oral Daily    losartan  100 mg Oral Daily    metFORMIN  500 mg Oral BID WC    potassium chloride  10 mEq Oral BID WC    sodium chloride flush  5-40 mL Intravenous 2 times per day    enoxaparin  40 mg Subcutaneous Daily    insulin lispro  0-12 Units Subcutaneous TID WC    insulin lispro  0-6 Units Subcutaneous Nightly    bumetanide  1 mg Intravenous Q6H     perflutren lipid microspheres, 1.5 mL, ONCE PRN  traMADol, 50 mg, Q8H PRN  sodium chloride flush, 5-40 mL, PRN  sodium chloride, 25 mL, PRN  ondansetron, 4 mg, Q8H PRN   Or  ondansetron, 4 mg, Q6H PRN  polyethylene glycol, 17 g, Daily PRN  acetaminophen, 650 mg, Q6H PRN   Or  acetaminophen, 650 mg, Q6H PRN  glucose, 15 g, PRN  dextrose, 12.5 g, PRN  glucagon (rDNA), 1 mg, PRN  dextrose, 100 mL/hr, PRN  albuterol, 2.5 mg, Q2H PRN         Objective:    BP (!) 111/58   Pulse 58   Temp 98 °F (36.7 °C) (Oral)   Resp 16   Ht 5' 4\" (1.626 m)   Wt 299 lb (135.6 kg)   LMP 06/23/2018   SpO2 95%   BMI 51.32 kg/m²   General Appearance: alert and oriented to person, place and time and in no acute distress  Skin: warm and dry  Head: normocephalic and atraumatic  Neck: neck supple and non tender  Pulmonary/Chest: clear to auscultation bilaterally- no wheezes, rales or rhonchi, normal air movement, no respiratory distress  Cardiovascular: normal rate, normal S1 and S2 and no murmur  Abdomen: soft, non-tender, non-distended, obese  Extremities: mild bilateral pretibial edema, ankle and foot with 2+    Recent Labs     06/13/21  0310 06/14/21  1045 06/15/21  0830    134 136   K 4.3 4.1 4.1   CL 93* 92* 95*   CO2 33* 34* 30*   BUN 28* 24* 20   CREATININE 0.9 0.7 0.7   GLUCOSE 141* 116* 194*   CALCIUM 9.4 9.7 9.7       No results for input(s): WBC, RBC, HGB, HCT, MCV, MCH, MCHC, RDW, PLT, MPV in the last 72 hours. Radiology:   CTA PULMONARY W CONTRAST   Final Result   No evidence of acute pulmonary embolic disease. Likely old granulomatous   disease as outlined above. Assessment:    Active Problems:    Hypoxia  Resolved Problems:    * No resolved hospital problems. *      Plan:    1. Hypoxia  Appreciate pulmonary help  Likely multifactorial.     Discussed with Dr Nerissa Ferrer today. He is worried about severe pulmonary HTN and right heart failure. She has been diuresed for several days but sats still plummet when she lies down. Kidneys are tolerating so far and she has fair response - almost 9 L off since admission. Consulting cardiology to see about right heart cath. They are getting echo first.     Very likely has sleep apnea as well and testing arranged for outpatient.     Does have history of sarcoidosis, but CTA is pretty normal appearing, no infiltrates.  No clots.     It is interesting that she immediately desaturates when lying flat and this may be to redistribution of blood flow to areas of lung that do not oxygenate as well.  It is possible that CPAP or BiPAP fixes this as well as her KISHORE.     Will continue diuresis, but if she is still very hypoxic when flat on her back.     I did order overnight sleep oximetry, hopefully with record of overnight hypoxia can at least get her some home O2 until she does her sleep study.     2.  DM  Metformin restarted after CTA of lungs. Sugars control is good. Sliding scale if needed.     3. Hypothyroidism  Continue synthroid.     Electronically signed by Lucila Dunlap MD on 6/15/2021 at 4:55 PM

## 2021-06-15 NOTE — PROGRESS NOTES
Pulsox was _89____% on room air at rest.   Ambulated patient on room air. Oxygen saturation was _86___% on room air while ambulating. Oxygen applied. Recovery pulsox was __91__% on __2__L of oxygen while ambulating.

## 2021-06-15 NOTE — CONSULTS
Medications Prior to admit:  Prior to Admission medications    Medication Sig Start Date End Date Taking?  Authorizing Provider   vitamin D (ERGOCALCIFEROL) 1.25 MG (01119 UT) CAPS capsule Take 1 capsule by mouth once a week 4/19/21  Yes Carlton Orozco DO   metFORMIN (GLUCOPHAGE) 500 MG tablet Take 1 tablet by mouth 2 times daily (with meals) 4/19/21  Yes Magda Temple DO   losartan (COZAAR) 100 MG tablet Take 1 tablet by mouth daily 4/19/21  Yes Magda Temple DO   potassium chloride (KLOR-CON) 10 MEQ extended release tablet Take 1 tablet by mouth 2 times daily 3/19/21  Yes Magda Temple DO   furosemide (LASIX) 40 MG tablet Take 1 tablet by mouth 2 times daily 3/19/21  Yes Magda Temple DO   folic acid (FOLVITE) 1 MG tablet Take 1 tablet by mouth daily 3/19/21  Yes Magda Temple DO   levothyroxine (SYNTHROID) 25 MCG tablet Take 1 tablet by mouth daily 3/19/21  Yes Magda Temple DO   aspirin 81 MG tablet Take 81 mg by mouth daily   Yes Historical Provider, MD       Current Medications:    Current Facility-Administered Medications: traMADol (ULTRAM) tablet 50 mg, 50 mg, Oral, Q8H PRN  aspirin chewable tablet 81 mg, 81 mg, Oral, Daily  folic acid (FOLVITE) tablet 1 mg, 1 mg, Oral, Daily  levothyroxine (SYNTHROID) tablet 25 mcg, 25 mcg, Oral, Daily  losartan (COZAAR) tablet 100 mg, 100 mg, Oral, Daily  metFORMIN (GLUCOPHAGE) tablet 500 mg, 500 mg, Oral, BID WC  potassium chloride (KLOR-CON M) extended release tablet 10 mEq, 10 mEq, Oral, BID WC  sodium chloride flush 0.9 % injection 5-40 mL, 5-40 mL, Intravenous, 2 times per day  sodium chloride flush 0.9 % injection 5-40 mL, 5-40 mL, Intravenous, PRN  0.9 % sodium chloride infusion, 25 mL, Intravenous, PRN  enoxaparin (LOVENOX) injection 40 mg, 40 mg, Subcutaneous, Daily  ondansetron (ZOFRAN-ODT) disintegrating tablet 4 mg, 4 mg, Oral, Q8H PRN **OR** ondansetron (ZOFRAN) injection 4 mg, 4 mg, Intravenous, Q6H PRN  polyethylene glycol (GLYCOLAX) packet 17 g, 17 g, Oral, Daily PRN  acetaminophen (TYLENOL) tablet 650 mg, 650 mg, Oral, Q6H PRN **OR** acetaminophen (TYLENOL) suppository 650 mg, 650 mg, Rectal, Q6H PRN  insulin lispro (HUMALOG) injection vial 0-12 Units, 0-12 Units, Subcutaneous, TID WC  insulin lispro (HUMALOG) injection vial 0-6 Units, 0-6 Units, Subcutaneous, Nightly  glucose (GLUTOSE) 40 % oral gel 15 g, 15 g, Oral, PRN  dextrose 50 % IV solution, 12.5 g, Intravenous, PRN  glucagon (rDNA) injection 1 mg, 1 mg, Intramuscular, PRN  dextrose 5 % solution, 100 mL/hr, Intravenous, PRN  albuterol (PROVENTIL) nebulizer solution 2.5 mg, 2.5 mg, Nebulization, Q2H PRN  bumetanide (BUMEX) injection 1 mg, 1 mg, Intravenous, Q6H    Allergies:  Norco [hydrocodone-acetaminophen]    Social History:    Social History     Socioeconomic History    Marital status:      Spouse name: Not on file    Number of children: Not on file    Years of education: Not on file    Highest education level: Not on file   Occupational History     Employer: [employer]   Tobacco Use    Smoking status: Never Smoker    Smokeless tobacco: Never Used   Vaping Use    Vaping Use: Never used   Substance and Sexual Activity    Alcohol use: No    Drug use: No    Sexual activity: Yes   Other Topics Concern    Not on file   Social History Narrative    Not on file     Social Determinants of Health     Financial Resource Strain: Low Risk     Difficulty of Paying Living Expenses: Not hard at all   Food Insecurity: No Food Insecurity    Worried About [facility] in the Last Year: Never true    Lisa of Food in the Last Year: Never true   Transportation Needs: No Transportation Needs    Lack of Transportation (Medical): No    Lack of Transportation (Non-Medical):  No   Physical Activity:     Days of Exercise per Week:     Minutes of Exercise per Session:    Stress:     Feeling of Stress :    Social Connections:     Frequency of Communication with Friends and Family:     Frequency of Social Gatherings with Friends and Family:     Attends Zoroastrian Services:     Active Member of Clubs or Organizations:     Attends Club or Organization Meetings:     Marital Status:    Intimate Partner Violence:     Fear of Current or Ex-Partner:     Emotionally Abused:     Physically Abused:     Sexually Abused:        Family History:   Family History   Problem Relation Age of Onset    Diabetes Mother     Coronary Art Dis Brother        REVIEW OF SYSTEMS:     · Constitutional: Denies fatigue, fevers, chills or night sweats  · Eyes: Denies visual changes or drainage  · ENT: Denies headaches or hearing loss. No mouth sores or sore throat. No epistaxis   · Cardiovascular: Denies chest pain, pressure or palpitations. No lower extremity swelling. · Respiratory: Denies LIU, cough, orthopnea or PND. No hemoptysis   · Gastrointestinal: Denies hematemesis or anorexia. No hematochezia or melena    · Genitourinary: Denies urgency, dysuria or hematuria. · Musculoskeletal: Denies gait disturbance, weakness or joint complaints  · Integumentary: Denies rash, hives or pruritis   · Neurological: Denies dizziness, headaches or seizures. No numbness or tingling  · Psychiatric: Denies anxiety or depression. · Endocrine: Denies temperature intolerance. No recent weight change. .  · Hematologic/Lymphatic: Denies abnormal bruising or bleeding. No swollen lymph nodes    PHYSICAL EXAM:   /74   Pulse 84   Temp 98.2 °F (36.8 °C) (Oral)   Resp 18   Ht 5' 4\" (1.626 m)   Wt 299 lb (135.6 kg)   LMP 06/23/2018   SpO2 92%   BMI 51.32 kg/m²   CONST:  Well developed, well nourished who appears of stated age. Awake, alert and cooperative. No apparent distress. HEENT:   Head- Normocephalic, atraumatic   Eyes- Conjunctivae pink, anicteric  Throat- Oral mucosa pink and moist  Neck-  No stridor, trachea midline, no jugular venous distention. No carotid bruit. CHEST: Chest symmetrical and non-tender to palpation. No accessory muscle use or intercostal retractions  RESPIRATORY: Lung sounds - clear throughout fields   CARDIOVASCULAR:     Heart Inspection- shows no noted pulsations  Heart Palpation- no heaves or thrills; PMI is non-displaced   Heart Ausculation- Regular rate and rhythm, no murmur. No s3, s4 or rub   PV: Minimal lower extremity edema. No varicosities. Pedal pulses palpable, no clubbing or cyanosis   ABDOMEN: Soft, non-tender to light palpation. Bowel sounds present. No palpable masses no organomegaly; no abdominal bruit  MS: Good muscle strength and tone. No atrophy or abnormal movements. : Deferred  SKIN: Warm and dry no statis dermatitis or ulcers   NEURO / PSYCH: Oriented to person, place and time. Speech clear and appropriate. Follows all commands. Pleasant affect     DATA:    ECG / Tele strips: please see HPI   Diagnostic:    echocardiogram 3, 10, 2021  LVEF is 50-55%. There is normal LV segmental wall motion. Moderate concentric left ventricular hypertrophy. The right ventricular systolic function is normal    Cardiac cath: 5/12/2021, by Dr. Yi Lee  Angiographic Results/findings:  Left Main: No angiographically significant stenosis  LAD: Tortuous. No angiographically significant stenosis  D1: Bifurcating vessel. No angiographically significant stenosis. D2: Small vessel. Cx: Tortuous. No angiographically significant stenosis. OM1: No angiographically significant stenosis. Ramus: No angiographically significant stenosis. RCA: Huge vessel with anterior takeoff. Hyper-dominant. No angiographically significant stenosis. PDA: No angiographically significant stenosis. PLB: No angiographically significant stenosis. LV: Normal LV size and systolic function. No wall motion abnormalities.   Ejection fraction 50%    Stress test: 5/10/2021  Abnormal study.  Scintigraphic evidence for mild ischemic in the         apical inferolateral wall.        Dilated left ventricle with normal LV systolic function, EF             Intake/Output Summary (Last 24 hours) at 6/15/2021 1037  Last data filed at 6/15/2021 0018  Gross per 24 hour   Intake --   Output 1300 ml   Net -1300 ml       Labs:   CBC: No results for input(s): WBC, HGB, HCT, PLT in the last 72 hours. BMP:   Recent Labs     06/14/21  1045 06/15/21  0830    136   K 4.1 4.1   CO2 34* 30*   BUN 24* 20   CREATININE 0.7 0.7   LABGLOM >60 >60   CALCIUM 9.7 9.7     Mag: No results for input(s): MG in the last 72 hours. Phos: No results for input(s): PHOS in the last 72 hours. TSH: No results for input(s): TSH in the last 72 hours. HgA1c:   Lab Results   Component Value Date    LABA1C 6.9 (H) 06/10/2021     Lab Results   Component Value Date     06/06/2021     proBNP: No results for input(s): PROBNP in the last 72 hours. PT/INR: No results for input(s): PROTIME, INR in the last 72 hours. APTT:No results for input(s): APTT in the last 72 hours. CARDIAC ENZYMES:No results for input(s): CKTOTAL, CKMB, CKMBINDEX, TROPONINI in the last 72 hours.   FASTING LIPID PANEL:  Lab Results   Component Value Date    CHOL 209 03/11/2021    CHOL 234 05/20/2018    HDL 29 03/11/2021    LDLCALC 132 05/20/2018    TRIG 255 03/11/2021     LIVER PROFILE:  Recent Labs     06/14/21  1045   AST 19   ALT 21   LABALBU 3.8     Assessment  Acute hypoxic respiratory failure on oxygen with only while asleep  HFpEF with EF 55 to 60% in 2021  Diabetes type 2  Sarcoidosis    Plan  On Bumex 1 mg every 6hr  So far -8.2 L  Pt is having only hypoxia when she is lying flat improved with elevating head of the bed  She does not feel any sob, or has no cyanosis, and her hypoxia with corrected with two litres of nasal canula  Will repeat an echo, for assessing RV function, and pulmonary HTN  Depending on the results with plan further intervention  Given her symp, Most likely it can be obesity

## 2021-06-15 NOTE — PROGRESS NOTES
Discuss case with Dr Lynnette Rees  Her hypoxia is not explained with her sarcoid  She is negative 9 L yet Bun and creatinine normal   I am concern about HFpEF along with PHTN  We need [facility]   Cardiology consulted

## 2021-06-15 NOTE — PROGRESS NOTES
Physician Progress Note      Jana Covington  CSN #:                  [id number]  :                       1965  ADMIT DATE:       6/10/2021 1:43 AM  [address] [place] DATE:  RESPONDING  PROVIDER #:        Alex Merlos          QUERY TEXT:    Dr. Nuvia Garcia,    Patient admitted with shortness of breath. Noted documentation of Acute   hypoxic respiratory failure in the H&P on 6/10 and Hypoxia in the    progress note. If possible, please document in progress notes and discharge summary if you   are evaluating and /or treating any of the following: The medical record reflects the following:  Risk Factors: Hx of sarcoidosis,  Clinical Indicators: per the H&P Acute hypoxic respiratory failure ; patient   reports oxygen saturation in the 80s She has no history of oxygen use at   home. \", per the  progress note \"Hypoxia. .. Likely multifactorial\", SpO2 as   low as 79%, documented respirations 16-20  Treatment: Supplemental O2, pulmonology consult    Thank you,  Yolanda Ray RN  Clinical Documentation Improvement  [phone]  Options provided:  -- Acute respiratory failure confirmed and hypoxia ruled out  -- Hypoxia confirmed and acute respiratory failure ruled out  -- Other - I will add my own diagnosis  -- Disagree - Not applicable / Not valid  -- Disagree - Clinically unable to determine / Unknown  -- Refer to Clinical Documentation Reviewer    PROVIDER RESPONSE TEXT:    After study, hypoxia confirmed and acute respiratory failure ruled out.     Query created by: Kathy Colon on 6/15/2021 12:57 PM      Electronically signed by:  Alex Merlos 6/15/2021 2:36 PM

## 2021-06-16 LAB
ANION GAP SERPL CALCULATED.3IONS-SCNC: 9 MMOL/L (ref 7–16)
B.E.: 9.1 MMOL/L (ref -3–3)
BUN BLDV-MCNC: 22 MG/DL (ref 6–20)
CALCIUM SERPL-MCNC: 9.7 MG/DL (ref 8.6–10.2)
CHLORIDE BLD-SCNC: 94 MMOL/L (ref 98–107)
CO2: 34 MMOL/L (ref 22–29)
COHB: 2.1 % (ref 0–1.5)
CREAT SERPL-MCNC: 0.8 MG/DL (ref 0.5–1)
CRITICAL: ABNORMAL
DATE ANALYZED: ABNORMAL
DATE OF COLLECTION: ABNORMAL
GFR AFRICAN AMERICAN: >60
GFR NON-AFRICAN AMERICAN: >60 ML/MIN/1.73
GLUCOSE BLD-MCNC: 128 MG/DL (ref 74–99)
HCO3: 35.3 MMOL/L (ref 22–26)
HHB: 8 % (ref 0–5)
LAB: ABNORMAL
LV EF: 58 %
LVEF MODALITY: NORMAL
Lab: ABNORMAL
METER GLUCOSE: 132 MG/DL (ref 74–99)
METHB: 0.3 % (ref 0–1.5)
MODE: ABNORMAL
O2 CONTENT: 17.4 ML/DL
O2 SATURATION: 91.8 % (ref 92–98.5)
O2HB: 89.6 % (ref 94–97)
OPERATOR ID: 420
PATIENT TEMP: 37 C
PCO2: 54.6 MMHG (ref 35–45)
PH BLOOD GAS: 7.43 (ref 7.35–7.45)
PO2: 60.7 MMHG (ref 75–100)
POTASSIUM REFLEX MAGNESIUM: 3.8 MMOL/L (ref 3.5–5)
SODIUM BLD-SCNC: 137 MMOL/L (ref 132–146)
SOURCE, BLOOD GAS: ABNORMAL
THB: 13.8 G/DL (ref 11.5–16.5)
TIME ANALYZED: 1118

## 2021-06-16 PROCEDURE — 82962 GLUCOSE BLOOD TEST: CPT

## 2021-06-16 PROCEDURE — 36415 COLL VENOUS BLD VENIPUNCTURE: CPT

## 2021-06-16 PROCEDURE — 99232 SBSQ HOSP IP/OBS MODERATE 35: CPT | Performed by: INTERNAL MEDICINE

## 2021-06-16 PROCEDURE — 2580000003 HC RX 258: Performed by: FAMILY MEDICINE

## 2021-06-16 PROCEDURE — 2060000000 HC ICU INTERMEDIATE R&B

## 2021-06-16 PROCEDURE — 6370000000 HC RX 637 (ALT 250 FOR IP): Performed by: FAMILY MEDICINE

## 2021-06-16 PROCEDURE — 93306 TTE W/DOPPLER COMPLETE: CPT

## 2021-06-16 PROCEDURE — 6370000000 HC RX 637 (ALT 250 FOR IP): Performed by: INTERNAL MEDICINE

## 2021-06-16 PROCEDURE — 6360000002 HC RX W HCPCS: Performed by: FAMILY MEDICINE

## 2021-06-16 PROCEDURE — 2500000003 HC RX 250 WO HCPCS: Performed by: INTERNAL MEDICINE

## 2021-06-16 PROCEDURE — 80048 BASIC METABOLIC PNL TOTAL CA: CPT

## 2021-06-16 PROCEDURE — 82805 BLOOD GASES W/O2 SATURATION: CPT

## 2021-06-16 PROCEDURE — 94762 N-INVAS EAR/PLS OXIMTRY CONT: CPT

## 2021-06-16 PROCEDURE — 99233 SBSQ HOSP IP/OBS HIGH 50: CPT | Performed by: INTERNAL MEDICINE

## 2021-06-16 RX ORDER — LIDOCAINE AND PRILOCAINE 25; 25 MG/G; MG/G
CREAM TOPICAL ONCE
Status: COMPLETED | OUTPATIENT
Start: 2021-06-16 | End: 2021-06-16

## 2021-06-16 RX ADMIN — METFORMIN HYDROCHLORIDE 500 MG: 500 TABLET ORAL at 08:28

## 2021-06-16 RX ADMIN — LOSARTAN POTASSIUM 100 MG: 50 TABLET, FILM COATED ORAL at 08:28

## 2021-06-16 RX ADMIN — FOLIC ACID 1 MG: 1 TABLET ORAL at 08:28

## 2021-06-16 RX ADMIN — SODIUM CHLORIDE, PRESERVATIVE FREE 10 ML: 5 INJECTION INTRAVENOUS at 08:29

## 2021-06-16 RX ADMIN — BUMETANIDE 1 MG: 0.25 INJECTION INTRAMUSCULAR; INTRAVENOUS at 18:36

## 2021-06-16 RX ADMIN — ENOXAPARIN SODIUM 40 MG: 40 INJECTION SUBCUTANEOUS at 08:29

## 2021-06-16 RX ADMIN — LEVOTHYROXINE SODIUM 25 MCG: 25 TABLET ORAL at 08:28

## 2021-06-16 RX ADMIN — SODIUM CHLORIDE, PRESERVATIVE FREE 10 ML: 5 INJECTION INTRAVENOUS at 22:09

## 2021-06-16 RX ADMIN — ASPIRIN 81 MG: 81 TABLET, CHEWABLE ORAL at 08:33

## 2021-06-16 RX ADMIN — LIDOCAINE AND PRILOCAINE 1 APPLICATOR: 25; 25 CREAM TOPICAL at 11:10

## 2021-06-16 RX ADMIN — POTASSIUM CHLORIDE 10 MEQ: 750 TABLET, EXTENDED RELEASE ORAL at 08:28

## 2021-06-16 RX ADMIN — BUMETANIDE 1 MG: 0.25 INJECTION INTRAMUSCULAR; INTRAVENOUS at 05:46

## 2021-06-16 RX ADMIN — BUMETANIDE 1 MG: 0.25 INJECTION INTRAMUSCULAR; INTRAVENOUS at 12:25

## 2021-06-16 RX ADMIN — METFORMIN HYDROCHLORIDE 500 MG: 500 TABLET ORAL at 16:52

## 2021-06-16 RX ADMIN — SODIUM CHLORIDE, PRESERVATIVE FREE 10 ML: 5 INJECTION INTRAVENOUS at 11:18

## 2021-06-16 RX ADMIN — POTASSIUM CHLORIDE 10 MEQ: 750 TABLET, EXTENDED RELEASE ORAL at 16:55

## 2021-06-16 RX ADMIN — ONDANSETRON 4 MG: 2 INJECTION INTRAMUSCULAR; INTRAVENOUS at 08:33

## 2021-06-16 ASSESSMENT — PAIN SCALES - GENERAL
PAINLEVEL_OUTOF10: 0

## 2021-06-16 ASSESSMENT — PAIN DESCRIPTION - PROGRESSION: CLINICAL_PROGRESSION: NOT CHANGED

## 2021-06-16 NOTE — PROGRESS NOTES
P Quality Flow/Interdisciplinary Rounds Progress Note        Quality Flow Rounds held on June 16, 2021    Disciplines Attending:  Bedside Nurse, ,  and Nursing Unit Leadership    Dawit Gamble was admitted on 6/10/2021  1:43 AM    Anticipated Discharge Date:  Expected Discharge Date: 06/18/21    Disposition:    Vel Score:  Vel Scale Score: 23    Readmission Risk              Risk of Unplanned Readmission:  11           Discussed patient goal for the day, patient clinical progression, and barriers to discharge.   The following Goal(s) of the Day/Commitment(s) have been identified:  echo today       Evelia Ugalde RN  June 16, 2021

## 2021-06-16 NOTE — PROGRESS NOTES
INPATIENT CARDIOLOGY FOLLOW-UP    Name: Gabrielle Gottlieb    Age: 63 y.o. Date of Admission: 6/10/2021  1:43 AM    Date of Service: 6/16/2021    Chief Complaint: Follow-up for possible RHC    Interim History:  No new overnight cardiac complaints. Currently with no complaints of CP, SOB, palpitations, dizziness, or lightheadedness. SR on telemetry. Still hypoxic lying down on oxygen, ABG results reviewed, negative almost 11 L. Review of Systems:   Cardiac: As per HPI  General: No fever, chills  Pulmonary: As per HPI  HEENT: No visual disturbances, difficult swallowing  GI: No nausea, vomiting  : No dysuria, hematuria  Endocrine: No thyroid disease or DM  Musculoskeletal: VALLEJO x 4, no focal motor deficits  Skin: Intact, no rashes  Neuro: No headache, seizures  Psych: Currently with no depression, anxiety    Problem List:  Patient Active Problem List   Diagnosis    Lung nodule    Class 3 obesity in adult    Essential hypertension    Sarcoidosis    Chronic pain syndrome    Chronic bilateral low back pain with bilateral sciatica    Lumbar radiculopathy    Left hip pain    Morbid obesity with BMI of 50.0-59.9, adult (HCC)    Primary osteoarthritis of left hip    CAD in native artery    Hypoxia    Acute respiratory failure with hypoxia (HCC)       Allergies:   Allergies   Allergen Reactions    Norco [Hydrocodone-Acetaminophen] Other (See Comments)     Patient reports having a warm flush feeling after taking Norco, similar to what she feels after having IV contrast.       Current Medications:  Current Facility-Administered Medications   Medication Dose Route Frequency Provider Last Rate Last Admin    lidocaine-prilocaine (EMLA) cream   Topical Once Ariel Bell MD        perflutren lipid microspheres (DEFINITY) injection 1.65 mg  1.5 mL Intravenous ONCE PRN Chidi Oliva MD        traMADol Fartun Strouderer) tablet 50 mg  50 mg Oral Q8H PRN Gideon Kulkarni MD   50 mg at 06/15/21 7993    aspirin chewable tablet 81 mg  81 mg Oral Daily Riana Rowe MD   81 mg at 23/95/98 8087    folic acid (FOLVITE) tablet 1 mg  1 mg Oral Daily Riana Rowe MD   1 mg at 06/16/21 7968    levothyroxine (SYNTHROID) tablet 25 mcg  25 mcg Oral Daily Riana Rowe MD   25 mcg at 06/16/21 0828    losartan (COZAAR) tablet 100 mg  100 mg Oral Daily Riana Rowe MD   100 mg at 06/16/21 0828    metFORMIN (GLUCOPHAGE) tablet 500 mg  500 mg Oral BID  Riana Rowe MD   500 mg at 06/16/21 0828    potassium chloride (KLOR-CON M) extended release tablet 10 mEq  10 mEq Oral BID  Riana Rowe MD   10 mEq at 06/16/21 0828    sodium chloride flush 0.9 % injection 5-40 mL  5-40 mL Intravenous 2 times per day Riana Rowe MD   10 mL at 06/16/21 0829    sodium chloride flush 0.9 % injection 5-40 mL  5-40 mL Intravenous PRN Riana Rowe MD   10 mL at 06/16/21 1118    0.9 % sodium chloride infusion  25 mL Intravenous PRN Riana Rowe MD        enoxaparin (LOVENOX) injection 40 mg  40 mg Subcutaneous Daily Riana Rowe MD   40 mg at 06/16/21 0829    ondansetron (ZOFRAN-ODT) disintegrating tablet 4 mg  4 mg Oral Q8H PRN Riana Rowe MD        Or    ondansetron [facility]) injection 4 mg  4 mg Intravenous Q6H PRN Riana Rowe MD   4 mg at 06/16/21 4352    polyethylene glycol (GLYCOLAX) packet 17 g  17 g Oral Daily PRN Riana Rowe MD        acetaminophen (TYLENOL) tablet 650 mg  650 mg Oral Q6H PRN Riana Rowe MD   650 mg at 06/15/21 2342    Or    acetaminophen (TYLENOL) suppository 650 mg  650 mg Rectal Q6H PRN Riana Rowe MD        insulin lispro (HUMALOG) injection vial 0-12 Units  0-12 Units Subcutaneous TID  Riana Rowe MD        insulin lispro (HUMALOG) injection vial 0-6 Units  0-6 Units Subcutaneous Nightly Riana Rowe MD        glucose (GLUTOSE) 40 % oral gel 15 g  15 g Oral PRN Riana Rowe MD        dextrose 50 % IV solution  12.5 g Intravenous PRN Riana Rowe, MD        glucagon (rDNA) injection 1 mg  1 mg Intramuscular PRN Leann Vega MD        dextrose 5 % solution  100 mL/hr Intravenous PRN Leann Vega MD        albuterol (PROVENTIL) nebulizer solution 2.5 mg  2.5 mg Nebulization Q2H PRN Leann Vega MD        bumetanide [facility]) injection 1 mg  1 mg Intravenous Q6H Mliey Barclay MD   1 mg at 06/16/21 0546      sodium chloride      dextrose         Physical Exam:  BP (!) 109/59   Pulse 90   Temp 97.7 °F (36.5 °C) (Oral)   Resp 18   Ht 5' 4\" (1.626 m)   Wt 299 lb (135.6 kg)   LMP 06/23/2018   SpO2 93%   BMI 51.32 kg/m²   Wt Readings from Last 3 Encounters:   06/16/21 299 lb (135.6 kg)   05/12/21 299 lb (135.6 kg)   06/23/20 300 lb (136.1 kg)     Appearance: Awake, alert, no acute respiratory distress  Skin: Intact, no rash  Head: Normocephalic, atraumatic  Eyes: EOMI, no conjunctival erythema  ENMT: No pharyngeal erythema, MMM, no rhinorrhea  Neck: Supple, no elevated JVP, no carotid bruits  Lungs: Clear to auscultation bilaterally. No wheezes, rales, or rhonchi. Cardiac: Regular rate and rhythm, +S1S2, no murmurs apparent  Abdomen: Soft, nontender, +bowel sounds  Extremities: Moves all extremities x 4, no lower extremity edema  Neurologic: No focal motor deficits apparent, normal mood and affect  Peripheral Pulses: Intact posterior tibial pulses bilaterally    Intake/Output:    Intake/Output Summary (Last 24 hours) at 6/16/2021 1129  Last data filed at 6/15/2021 2112  Gross per 24 hour   Intake --   Output 1500 ml   Net -1500 ml     No intake/output data recorded.     Laboratory Tests:  Recent Labs     06/14/21  1045 06/15/21  0830 06/16/21  0408    136 137   K 4.1 4.1 3.8   CL 92* 95* 94*   CO2 34* 30* 34*   BUN 24* 20 22*   CREATININE 0.7 0.7 0.8   GLUCOSE 116* 194* 128*   CALCIUM 9.7 9.7 9.7     Lab Results   Component Value Date    MG 2.2 06/06/2021     Recent Labs     06/14/21  1045   ALKPHOS 100   ALT 21   AST 19   PROT 8.4* BILITOT 0.4   LABALBU 3.8     No results for input(s): WBC, RBC, HGB, HCT, MCV, MCH, MCHC, RDW, PLT, MPV in the last 72 hours. Lab Results   Component Value Date    TROPONINI < 0.015 06/05/2021    TROPONINI < 0.015 06/05/2021    TROPONINI < 0.015 06/05/2021     Lab Results   Component Value Date    INR 1.0 (L) 06/05/2021    INR 1.0 (L) 05/09/2021    INR 1.0 (L) 04/29/2021    PROTIME 9.9 06/05/2021    PROTIME 10.2 05/09/2021    PROTIME 9.8 04/29/2021     Lab Results   Component Value Date    TSH 2.420 06/06/2021     Lab Results   Component Value Date    LABA1C 6.9 (H) 06/10/2021     Lab Results   Component Value Date     06/06/2021     Lab Results   Component Value Date    CHOL 209 (H) 03/11/2021    CHOL 175 03/02/2020    CHOL 195 11/06/2019     Lab Results   Component Value Date    TRIG 255 (H) 03/11/2021    TRIG 156 (H) 03/02/2020    TRIG 144 11/06/2019     Lab Results   Component Value Date    HDL 29 (L) 03/11/2021    HDL 30 (L) 03/02/2020    HDL 34 (L) 11/06/2019     Lab Results   Component Value Date    LDLCALC 132 05/20/2018    LDLCALC 91 12/31/2015    LDLCHOLESTEROL 129 03/11/2021    LDLCHOLESTEROL 114 03/02/2020    LDLCHOLESTEROL 132 11/06/2019     Lab Results   Component Value Date    VLDL 51 (H) 03/11/2021    VLDL 31 03/02/2020    VLDL 29 11/06/2019     No results found for: CHOLHDLRATIO  No results for input(s): PROBNP in the last 72 hours. Cardiac Tests:      echocardiogram 3, 10, 2021  LVEF is 50-55%.      There is normal LV segmental wall motion.       Moderate concentric left ventricular hypertrophy.       The right ventricular systolic function is normal     Cardiac cath: 5/12/2021, by Dr. Stephany Farrell  Angiographic Results/findings:  Left Main: No angiographically significant stenosis  LAD: Tortuous.  No angiographically significant stenosis  D1: Bifurcating vessel.  No angiographically significant stenosis. D2: Small vessel.   Cx: Tortuous.  No angiographically significant stenosis. OM1: No angiographically significant stenosis. Ramus: No angiographically significant stenosis. RCA: Huge vessel with anterior takeoff.  Hyper-dominant.  No angiographically significant stenosis. PDA: No angiographically significant stenosis. PLB: No angiographically significant stenosis.   LV: Normal LV size and systolic function.  No wall motion abnormalities.  Ejection fraction 50%    Stress test: 5/10/2021  Abnormal study.  Scintigraphic evidence for mild ischemic in the         apical inferolateral wall.        Dilated left ventricle with normal LV systolic function, EF           The 10-year ASCVD risk score (Lewis Gee, et al., 2013) is: 7.5%    Values used to calculate the score:      Age: 63 years      Sex: Female      Is Non- : No      Diabetic: Yes      Tobacco smoker: No      Systolic Blood Pressure: 103 mmHg      Is BP treated: Yes      HDL Cholesterol: 29 mg/dl      Total Cholesterol: 209 mg/dL    ASSESSMENT / PLAN:  Supine hypoxemia most likely hypoventilation syndrome  Morbid obesity with possible KISHORE  HTN  Type II DM  Hx of pulmonary sarcoidosis  Hypothyroidism on HRT  Hx of depression    Plan:   Preliminary results of repeat ECHO shows good RV function,   No signs of severe pulmonary HTN, may be mild  Not severe enough to explain supine hypoxemia  We will proceed with RHC, tomorrow at main   NPO overnight  Continue rest of meds  She will definitely benefit from CPAP or BiPAP  We will follow with the results  RHC procedure explained in detail to the patient and she vocalizes undertstanding

## 2021-06-16 NOTE — PROGRESS NOTES
Myself and another RN attempted to put IV in but unsuccessful. Stat IV team consult placed as patient has no access.

## 2021-06-16 NOTE — PROGRESS NOTES
Walked in to perform abg's and patient states that we are not touching her with one unless we numb it first. Spoke to respiratory who states they have never numbed prior to an abg so not sure what to recommend. Notified Dr. Niya Sawant.

## 2021-06-16 NOTE — PROGRESS NOTES
Spoke with cath lab at [facility] and they state patient will be on call for tomorrow. They also state that they will do treatment consent there. Also states okay to get all of meds including aspirin besides anticoagulants.

## 2021-06-16 NOTE — PROGRESS NOTES
Will see patient this pm   Most likely will send [facility]   She is negative 10 with normal creatinine and actually improving kidney function ,I am not aware of any lung etiology can cause 10 L fluid retention ,and yet with diuresis normal kidney function   No doubt she is high risk for KISHORE and she did not want sleep study in the past  ,but sleep apnea will not cause fluid overload unless associated with PHTN or HFpEF

## 2021-06-16 NOTE — PROGRESS NOTES
[facility] Hospitalist   Progress Note    Admitting Date and Time: 6/10/2021  1:43 AM  Admit Dx: Hypoxia [R09.02]    Subjective:    Patient was admitted with Hypoxia [R09.02]. Patient had overnight oximetry and did qualify for oxygen. Final to be read. Overall just tired of being in hospital, knows right heart cath planned for tomorrow. ROS: denies fever, chills, cp, sob, n/v, HA unless stated above.      aspirin  81 mg Oral Daily    folic acid  1 mg Oral Daily    levothyroxine  25 mcg Oral Daily    losartan  100 mg Oral Daily    metFORMIN  500 mg Oral BID WC    potassium chloride  10 mEq Oral BID WC    sodium chloride flush  5-40 mL Intravenous 2 times per day    enoxaparin  40 mg Subcutaneous Daily    insulin lispro  0-12 Units Subcutaneous TID WC    insulin lispro  0-6 Units Subcutaneous Nightly    bumetanide  1 mg Intravenous Q6H     perflutren lipid microspheres, 1.5 mL, ONCE PRN  traMADol, 50 mg, Q8H PRN  sodium chloride flush, 5-40 mL, PRN  sodium chloride, 25 mL, PRN  ondansetron, 4 mg, Q8H PRN   Or  ondansetron, 4 mg, Q6H PRN  polyethylene glycol, 17 g, Daily PRN  acetaminophen, 650 mg, Q6H PRN   Or  acetaminophen, 650 mg, Q6H PRN  glucose, 15 g, PRN  dextrose, 12.5 g, PRN  glucagon (rDNA), 1 mg, PRN  dextrose, 100 mL/hr, PRN  albuterol, 2.5 mg, Q2H PRN         Objective:    BP (!) 111/58   Pulse 82   Temp 98.2 °F (36.8 °C) (Oral)   Resp 18   Ht 5' 4\" (1.626 m)   Wt 299 lb (135.6 kg)   LMP 06/23/2018   SpO2 96%   BMI 51.32 kg/m²   General Appearance: alert and oriented to person, place and time and in no acute distress  Skin: warm and dry  Head: normocephalic and atraumatic  Neck: neck supple and non tender  Pulmonary/Chest: clear to auscultation bilaterally- no wheezes, rales or rhonchi, normal air movement, no respiratory distress  Cardiovascular: normal rate, normal S1 and S2 and no murmur  Abdomen: soft, non-tender, non-distended, obese  Extremities: mild bilateral pretibial edema, ankle and foot with 1-2+      Recent Labs     06/14/21  1045 06/15/21  0830 06/16/21  0408    136 137   K 4.1 4.1 3.8   CL 92* 95* 94*   CO2 34* 30* 34*   BUN 24* 20 22*   CREATININE 0.7 0.7 0.8   GLUCOSE 116* 194* 128*   CALCIUM 9.7 9.7 9.7       No results for input(s): WBC, RBC, HGB, HCT, MCV, MCH, MCHC, RDW, PLT, MPV in the last 72 hours. Radiology:   CTA PULMONARY W CONTRAST   Final Result   No evidence of acute pulmonary embolic disease. Likely old granulomatous   disease as outlined above. Assessment:    Active Problems:    Hypoxia  Resolved Problems:    * No resolved hospital problems. *      Plan:    1. Hypoxia  Appreciate pulmonary help  Likely multifactorial.     Cardiology consulted to rule out severe pulmonary HTN and right heart failure. Right heart cath planned for tomorrow. Echo with normal LV, mildly dilated RV with normal RV systolic function but PA pressure could not be estimated. She has been diuresed for several days but sats still plummet when she lies down. Kidneys are tolerating so far and she has fair response - over 11 L off since admission.     Very likely has sleep apnea as well and testing arranged for outpatient.     Does have history of sarcoidosis, but CTA is pretty normal appearing, no infiltrates.  No clots.     It is interesting that she immediately desaturates when lying flat and this may be to redistribution of blood flow to areas of lung that do not oxygenate as well.  It is possible that CPAP or BiPAP fixes this as well as her KISHORE.     Overnight sleep oximetry did record hypoxia - patient does qualify for oxygen.     2.  DM  Metformin restarted after CTA of lungs. Sugars control is good. Sliding scale if needed.     3. Hypothyroidism  Continue synthroid.     Electronically signed by Cecile Maddox MD on 6/16/2021 at 5:49 PM

## 2021-06-16 NOTE — PROGRESS NOTES
P[facility] contacted to place patient on willcall for transport to main cath lab tomorrow / Joe Moon 6/16/21 2:32 PM EDT

## 2021-06-16 NOTE — PROGRESS NOTES
To the patients room to answer her call light after seeing it was on. The patient states that she needs her heart monitor fixed. This RN fixed the patients heart monitor as one lead had popped off and assured the patient that it was still on and her heart rhythm and oxygen saturation were still visible from the nurses station. The patient proceeds to say \"I just cannot believe how long it took you to answer\"   This RN told the patient that the nurses are doing the best they can to answer all patient call lights as soon as possible as we are busy and that if something was wrong or there was an emergency that staff would be there to assist her immediately. The patient replied \"Don't mouth off to me\" as this RN walked out of her room.     Electronically signed by Angie Mcfadden RN on 6/16/21 at 1:28 AM EDT

## 2021-06-17 ENCOUNTER — HOSPITAL ENCOUNTER (OUTPATIENT)
Dept: CARDIAC CATH/INVASIVE PROCEDURES | Age: 56
Discharge: HOME | End: 2021-06-17
Payer: MEDICAID

## 2021-06-17 LAB
ANION GAP SERPL CALCULATED.3IONS-SCNC: 9 MMOL/L (ref 7–16)
BUN BLDV-MCNC: 16 MG/DL (ref 6–20)
CALCIUM SERPL-MCNC: 9.6 MG/DL (ref 8.6–10.2)
CHLORIDE BLD-SCNC: 95 MMOL/L (ref 98–107)
CO2: 33 MMOL/L (ref 22–29)
CREAT SERPL-MCNC: 0.7 MG/DL (ref 0.5–1)
GFR AFRICAN AMERICAN: >60
GFR NON-AFRICAN AMERICAN: >60 ML/MIN/1.73
GLUCOSE BLD-MCNC: 110 MG/DL (ref 74–99)
METER GLUCOSE: 121 MG/DL (ref 74–99)
METER GLUCOSE: 127 MG/DL (ref 74–99)
POTASSIUM REFLEX MAGNESIUM: 4.8 MMOL/L (ref 3.5–5)
SODIUM BLD-SCNC: 137 MMOL/L (ref 132–146)

## 2021-06-17 PROCEDURE — 2700000000 HC OXYGEN THERAPY PER DAY

## 2021-06-17 PROCEDURE — 82962 GLUCOSE BLOOD TEST: CPT

## 2021-06-17 PROCEDURE — 2500000003 HC RX 250 WO HCPCS

## 2021-06-17 PROCEDURE — 36415 COLL VENOUS BLD VENIPUNCTURE: CPT

## 2021-06-17 PROCEDURE — 99232 SBSQ HOSP IP/OBS MODERATE 35: CPT | Performed by: INTERNAL MEDICINE

## 2021-06-17 PROCEDURE — 6370000000 HC RX 637 (ALT 250 FOR IP): Performed by: FAMILY MEDICINE

## 2021-06-17 PROCEDURE — 2060000000 HC ICU INTERMEDIATE R&B

## 2021-06-17 PROCEDURE — C1894 INTRO/SHEATH, NON-LASER: HCPCS

## 2021-06-17 PROCEDURE — 2709999900 HC NON-CHARGEABLE SUPPLY

## 2021-06-17 PROCEDURE — 2500000003 HC RX 250 WO HCPCS: Performed by: INTERNAL MEDICINE

## 2021-06-17 PROCEDURE — 6370000000 HC RX 637 (ALT 250 FOR IP): Performed by: INTERNAL MEDICINE

## 2021-06-17 PROCEDURE — 93451 RIGHT HEART CATH: CPT | Performed by: INTERNAL MEDICINE

## 2021-06-17 PROCEDURE — C1751 CATH, INF, PER/CENT/MIDLINE: HCPCS

## 2021-06-17 PROCEDURE — 6360000002 HC RX W HCPCS

## 2021-06-17 PROCEDURE — 2580000003 HC RX 258: Performed by: FAMILY MEDICINE

## 2021-06-17 PROCEDURE — 80048 BASIC METABOLIC PNL TOTAL CA: CPT

## 2021-06-17 RX ADMIN — TRAMADOL HYDROCHLORIDE 50 MG: 50 TABLET, FILM COATED ORAL at 05:40

## 2021-06-17 RX ADMIN — TRAMADOL HYDROCHLORIDE 50 MG: 50 TABLET, FILM COATED ORAL at 21:44

## 2021-06-17 RX ADMIN — BUMETANIDE 1 MG: 0.25 INJECTION INTRAMUSCULAR; INTRAVENOUS at 00:29

## 2021-06-17 RX ADMIN — SODIUM CHLORIDE, PRESERVATIVE FREE 10 ML: 5 INJECTION INTRAVENOUS at 21:46

## 2021-06-17 RX ADMIN — SODIUM CHLORIDE, PRESERVATIVE FREE 5 ML: 5 INJECTION INTRAVENOUS at 08:37

## 2021-06-17 RX ADMIN — LEVOTHYROXINE SODIUM 25 MCG: 25 TABLET ORAL at 08:31

## 2021-06-17 RX ADMIN — POTASSIUM CHLORIDE 10 MEQ: 750 TABLET, EXTENDED RELEASE ORAL at 21:44

## 2021-06-17 RX ADMIN — ACETAMINOPHEN 650 MG: 325 TABLET ORAL at 08:31

## 2021-06-17 RX ADMIN — BUMETANIDE 1 MG: 0.25 INJECTION INTRAMUSCULAR; INTRAVENOUS at 21:44

## 2021-06-17 ASSESSMENT — PAIN SCALES - GENERAL
PAINLEVEL_OUTOF10: 0
PAINLEVEL_OUTOF10: 10
PAINLEVEL_OUTOF10: 10

## 2021-06-17 ASSESSMENT — PAIN DESCRIPTION - ORIENTATION: ORIENTATION: LEFT

## 2021-06-17 ASSESSMENT — PAIN DESCRIPTION - DESCRIPTORS: DESCRIPTORS: ACHING;CONSTANT;DISCOMFORT

## 2021-06-17 ASSESSMENT — PAIN DESCRIPTION - ONSET: ONSET: ON-GOING

## 2021-06-17 ASSESSMENT — PAIN - FUNCTIONAL ASSESSMENT: PAIN_FUNCTIONAL_ASSESSMENT: ACTIVITIES ARE NOT PREVENTED

## 2021-06-17 ASSESSMENT — PAIN DESCRIPTION - PAIN TYPE: TYPE: CHRONIC PAIN

## 2021-06-17 ASSESSMENT — PAIN DESCRIPTION - PROGRESSION: CLINICAL_PROGRESSION: NOT CHANGED

## 2021-06-17 ASSESSMENT — PAIN DESCRIPTION - LOCATION: LOCATION: HIP;LEG

## 2021-06-17 NOTE — PROGRESS NOTES
[facility] Quality Flow/Interdisciplinary Rounds Progress Note        Quality Flow Rounds held on June 17, 2021    Disciplines Attending:  Bedside Nurse, ,  and Nursing Unit Leadership    Fredrick Valladares was admitted on 6/10/2021  1:43 AM    Anticipated Discharge Date:  Expected Discharge Date: 06/18/21    Disposition:    Vel Score:  Vel Scale Score: 23    Readmission Risk              Risk of Unplanned Readmission:  11           Discussed patient goal for the day, patient clinical progression, and barriers to discharge. The following Goal(s) of the Day/Commitment(s) have been identified:  Heart Cath today at 1300, discharge planning.  Ambulating O2 Test.       Erick Valencia RN  June 17, 2021

## 2021-06-17 NOTE — PROGRESS NOTES
mg, PRN  dextrose, 100 mL/hr, PRN  albuterol, 2.5 mg, Q2H PRN         Objective:    /76   Pulse 90   Temp 97.8 °F (36.6 °C) (Axillary)   Resp 18   Ht 5' 4\" (1.626 m)   Wt 297 lb 1.6 oz (134.8 kg)   LMP 06/23/2018   SpO2 92%   BMI 51.00 kg/m²   General Appearance: alert and oriented to person, place and time, well-developed and well-nourished, in no acute distress  Skin: warm and dry, no rash or erythema  Head: normocephalic and atraumatic  Eyes: pupils equal, round, and reactive to light, extraocular eye movements intact, conjunctivae normal  ENT: tympanic membrane, external ear and ear canal normal bilaterally, oropharynx clear and moist with normal mucous membranes  Neck: neck supple and non tender without mass, no thyromegaly or thyroid nodules, no cervical lymphadenopathy   Pulmonary/Chest: clear to auscultation bilaterally- no wheezes, rales or rhonchi, normal air movement, no respiratory distress  Cardiovascular: normal rate, normal S1 and S2, no gallops, intact distal pulses and no carotid bruits  Abdomen: soft, non-tender, non-distended, normal bowel sounds, no masses or organomegaly      Recent Labs     06/14/21  1045 06/15/21  0830 06/16/21  0408    136 137   K 4.1 4.1 3.8   CL 92* 95* 94*   CO2 34* 30* 34*   BUN 24* 20 22*   CREATININE 0.7 0.7 0.8   GLUCOSE 116* 194* 128*   CALCIUM 9.7 9.7 9.7       No results for input(s): WBC, RBC, HGB, HCT, MCV, MCH, MCHC, RDW, PLT, MPV in the last 72 hours. Radiology:   CTA PULMONARY W CONTRAST   Final Result   No evidence of acute pulmonary embolic disease. Likely old granulomatous   disease as outlined above. Assessment:    Active Problems:    Hypoxia  Resolved Problems:    * No resolved hospital problems. *      Plan:  1. Hypoxia, unclear etiology, suspected severe pulmonary hypertension, pulmonary on board. 2. Patient plan for right heart cath today. 3. Obesity, explained at length weight loss.   4. Overnight pulse oximetry showing hypoxia, will need supplemental oxygen on DC. 5. Diabetes, on oral hypoglycemics, controlled.         Electronically signed by Zehra Hsu MD on 6/17/2021 at 8:18 AM

## 2021-06-17 NOTE — PROGRESS NOTES
Pulmonary [facility]                      Pulmonary Consult Neil Manzo Note :                                               Pulmonary  Consult       CC SOB     HISTORY OF PRESENT ILLNESS:    Patient doing much better   negative 12 L  SOB and Leg swelling has improved a lot   She seems more comfortable    PHYSICAL EXAMINATION:  Vitals:    06/16/21 2200   BP: 122/71   Pulse: 84   Resp: 18   Temp: 98 °F (36.7 °C)   SpO2: 92%     Constitutional: This is a well developed, well nourished 63y.o. year old female who is alert, oriented, cooperative and in no apparent distress. Head was normocephalic and atraumatic. EENT: Mallampati class :II  Extraocular muscles intact. External canals are patent and no discharge was appreciated. Septum was midline,   mucosa was without erythema, exudates or cobblestoning. No thrush was noted. Neck: Supple without thyromegaly. No elevated JVP. Trachea was midline. No carotid bruits were auscultated. Respiratory: Scattered rhonchi bilaterally other than that to clear no wheezing    Cardiovascular: Regular without murmur, clicks, gallops or rubs. There is no left or right ventricular heave. Pulses:  Carotid, radial and femoral pulses were equally bilaterally. Abdomen: Slightly rounded and soft without organomegaly. No rebound, rigidity or guarding was appreciated. Lymphatic: No lymphadenopathy. Musculoskeletal: No joint deformity   Extremities: Mild edema bilaterally  Skin:  Warm and dry. Good color, turgor and pigmentation. No lesions or scars. Neurological/Psychiatric: The patient's general behavior, level of consciousness, thought content and emotional status is normal.  Cranial nerves II-XII are intact.       DATA:       IMPRESSION:    1- Moderate hypoxic ,  2-Sarcoid   3-Possible HFpEF   4- Possible PHTN   5- Morbid obesity with BMO 48  6- Asthma   7-KISHORE/OHS   PLAN:     Patient no doubt has OHS and most likley KISHORE but

## 2021-06-17 NOTE — PROGRESS NOTES
INPATIENT CARDIOLOGY FOLLOW-UP    Name: Vin Odom    Age: 63 y.o. Date of Admission: 6/10/2021  1:43 AM    Date of Service: 6/17/2021    Chief Complaint: Follow-up for possible RHC    Interim History:  No new overnight cardiac complaints. Currently with no complaints of CP, SOB, palpitations, dizziness, or lightheadedness. She is scheduled for right heart catheterization at 1 PM today. SR on telemetry. Still hypoxic on  lying down on oxygen. , net almost - 12.3 L. Review of Systems:   Cardiac: As per HPI  General: No fever, chills  Pulmonary: As per HPI  HEENT: No visual disturbances, difficult swallowing  GI: No nausea, vomiting  Endocrine: No thyroid disease or DM  Musculoskeletal: VALLEJO x 4, no focal motor deficits  Skin: Intact, no rashes  Neuro/Psych: No headache or seizures    Problem List:  Patient Active Problem List   Diagnosis    Lung nodule    Class 3 obesity in adult    Essential hypertension    Sarcoidosis    Chronic pain syndrome    Chronic bilateral low back pain with bilateral sciatica    Lumbar radiculopathy    Left hip pain    Morbid obesity with BMI of 50.0-59.9, adult (HCC)    Primary osteoarthritis of left hip    CAD in native artery    Hypoxia    Acute respiratory failure with hypoxia (HCC)       Allergies:   Allergies   Allergen Reactions    Norco [Hydrocodone-Acetaminophen] Other (See Comments)     Patient reports having a warm flush feeling after taking Norco, similar to what she feels after having IV contrast.       Current Medications:  Current Facility-Administered Medications   Medication Dose Route Frequency Provider Last Rate Last Admin    perflutren lipid microspheres (DEFINITY) injection 1.65 mg  1.5 mL Intravenous ONCE PRN Lidia De Jesus MD        traMADol Raguel Lombard) tablet 50 mg  50 mg Oral Q8H PRN Jhonathan Smith MD   50 mg at 06/17/21 0505    aspirin chewable tablet 81 mg  81 mg Oral Daily Danny Mcpherson MD   81 mg at 06/16/21 9466    folic acid (FOLVITE) tablet 1 mg  1 mg Oral Daily Francesco Crocker MD   1 mg at 06/16/21 0791    levothyroxine (SYNTHROID) tablet 25 mcg  25 mcg Oral Daily Francesco Crocker MD   25 mcg at 06/16/21 0828    losartan (COZAAR) tablet 100 mg  100 mg Oral Daily Francesco Crocker MD   100 mg at 06/16/21 0828    metFORMIN (GLUCOPHAGE) tablet 500 mg  500 mg Oral BID  Francesco Crocker MD   500 mg at 06/16/21 1652    potassium chloride (KLOR-CON M) extended release tablet 10 mEq  10 mEq Oral BID  Francesco Crocker MD   10 mEq at 06/16/21 1655    sodium chloride flush 0.9 % injection 5-40 mL  5-40 mL Intravenous 2 times per day Francesco Crocker MD   10 mL at 06/16/21 2209    sodium chloride flush 0.9 % injection 5-40 mL  5-40 mL Intravenous PRN Francesco Crocker MD   10 mL at 06/16/21 1118    0.9 % sodium chloride infusion  25 mL Intravenous PRN Francesco Crocker MD        enoxaparin (LOVENOX) injection 40 mg  40 mg Subcutaneous Daily Francesco Crocker MD   40 mg at 06/16/21 0829    ondansetron (ZOFRAN-ODT) disintegrating tablet 4 mg  4 mg Oral Q8H PRN Francesco Crocker MD        Or    ondansetron [facility]) injection 4 mg  4 mg Intravenous Q6H PRN Francesco Crocker MD   4 mg at 06/16/21 9326    polyethylene glycol (GLYCOLAX) packet 17 g  17 g Oral Daily PRN Francesco Crocker MD        acetaminophen (TYLENOL) tablet 650 mg  650 mg Oral Q6H PRN Francesco Crocker MD   650 mg at 06/15/21 8012    Or    acetaminophen (TYLENOL) suppository 650 mg  650 mg Rectal Q6H PRN Francesco Crocker MD        insulin lispro (HUMALOG) injection vial 0-12 Units  0-12 Units Subcutaneous TID  Francesco Crocker MD        insulin lispro (HUMALOG) injection vial 0-6 Units  0-6 Units Subcutaneous Nightly Francesco Crocker MD        glucose (GLUTOSE) 40 % oral gel 15 g  15 g Oral PRN Francesco Crocker MD        dextrose 50 % IV solution  12.5 g Intravenous PRN Francesco Crocker MD        glucagon (rDNA) injection 1 mg  1 mg Intramuscular PRN Francesco Crocker MD  dextrose 5 % solution  100 mL/hr Intravenous PRN Rickey Rodriguez MD        albuterol (PROVENTIL) nebulizer solution 2.5 mg  2.5 mg Nebulization Q2H PRN Rickey Rodriguez MD        bumetanide [facility]) injection 1 mg  1 mg Intravenous Q6H Miley Tariq MD   1 mg at 06/17/21 0029      sodium chloride      dextrose         Physical Exam:  /76   Pulse 90   Temp 97.8 °F (36.6 °C) (Axillary)   Resp 18   Ht 5' 4\" (1.626 m)   Wt 297 lb 1.6 oz (134.8 kg)   LMP 06/23/2018   SpO2 92%   BMI 51.00 kg/m²   Wt Readings from Last 3 Encounters:   06/17/21 297 lb 1.6 oz (134.8 kg)   05/12/21 299 lb (135.6 kg)   06/23/20 300 lb (136.1 kg)     Appearance: Awake, alert, no acute respiratory distress  Skin: Intact, no rash  Head: Normocephalic, atraumatic  Eyes: EOMI, no conjunctival erythema  ENMT: No pharyngeal erythema, MMM, no rhinorrhea  Neck: Supple, no elevated JVP, no carotid bruits  Lungs: Clear to auscultation bilaterally. No wheezes, rales, or rhonchi. Cardiac: Regular rate and rhythm, +S1S2, no murmurs apparent  Abdomen: Soft, nontender, +bowel sounds  Extremities: Moves all extremities x 4, no lower extremity edema  Neurologic: No focal motor deficits apparent, normal mood and affect  Peripheral Pulses: Intact posterior tibial pulses bilaterally    Intake/Output:    Intake/Output Summary (Last 24 hours) at 6/17/2021 0729  Last data filed at 6/17/2021 0541  Gross per 24 hour   Intake --   Output 2600 ml   Net -2600 ml     No intake/output data recorded.     Laboratory Tests:  Recent Labs     06/14/21  1045 06/15/21  0830 06/16/21  0408    136 137   K 4.1 4.1 3.8   CL 92* 95* 94*   CO2 34* 30* 34*   BUN 24* 20 22*   CREATININE 0.7 0.7 0.8   GLUCOSE 116* 194* 128*   CALCIUM 9.7 9.7 9.7     Lab Results   Component Value Date    MG 2.2 06/06/2021     Recent Labs     06/14/21  1045   ALKPHOS 100   ALT 21   AST 19   PROT 8.4*   BILITOT 0.4   LABALBU 3.8     No results for input(s): WBC, RBC, HGB, HCT, MCV, MCH, MCHC, RDW, PLT, MPV in the last 72 hours. Lab Results   Component Value Date    TROPONINI < 0.015 06/05/2021    TROPONINI < 0.015 06/05/2021    TROPONINI < 0.015 06/05/2021     Lab Results   Component Value Date    INR 1.0 (L) 06/05/2021    INR 1.0 (L) 05/09/2021    INR 1.0 (L) 04/29/2021    PROTIME 9.9 06/05/2021    PROTIME 10.2 05/09/2021    PROTIME 9.8 04/29/2021     Lab Results   Component Value Date    TSH 2.420 06/06/2021     Lab Results   Component Value Date    LABA1C 6.9 (H) 06/10/2021     Lab Results   Component Value Date     06/06/2021     Lab Results   Component Value Date    CHOL 209 (H) 03/11/2021    CHOL 175 03/02/2020    CHOL 195 11/06/2019     Lab Results   Component Value Date    TRIG 255 (H) 03/11/2021    TRIG 156 (H) 03/02/2020    TRIG 144 11/06/2019     Lab Results   Component Value Date    HDL 29 (L) 03/11/2021    HDL 30 (L) 03/02/2020    HDL 34 (L) 11/06/2019     Lab Results   Component Value Date    LDLCALC 132 05/20/2018    LDLCALC 91 12/31/2015    LDLCHOLESTEROL 129 03/11/2021    LDLCHOLESTEROL 114 03/02/2020    LDLCHOLESTEROL 132 11/06/2019     Lab Results   Component Value Date    VLDL 51 (H) 03/11/2021    VLDL 31 03/02/2020    VLDL 29 11/06/2019     No results found for: CHOLHDLRATIO    Cardiac Tests:  Telemetry findings reviewed: SR at rate 80s, no new tachy/bradyarrhythmias overnight     EKG: Normal sinus rhythm, normal EKG.      Vitals and labs were reviewed: Blood pressure 115/53 heart rate 86 sats 95 % on 2 L of O2.     Labs-BUN/creatinine 20/1.7>> 22/0.8, rest of the electrolytes are normal.  Liver function normal.  Labs for today are pending.     Left heart catheterization-5/12/2021:  Hemodynamics:  Opening Aortic [id number]: 231/25  LV systolic IVMTGJUC: 570  JZLPC: 02  No significant gradient across the aortic valve     Angiographic Results/findings:  Left Main: No angiographically significant stenosis  LAD: Tortuous.  No angiographically significant stenosis  D1: Bifurcating vessel.  No angiographically significant stenosis. D2: Small vessel. Cx: Tortuous.  No angiographically significant stenosis. OM1: No angiographically significant stenosis. Ramus: No angiographically significant stenosis. RCA: Huge vessel with anterior takeoff.  Hyper-dominant.  No angiographically significant stenosis. PDA: No angiographically significant stenosis. PLB: No angiographically significant stenosis. LV: Normal LV size and systolic function.  No wall motion abnormalities.  Ejection fraction 50%     Lexiscan nuclear stress test-5/10/2021:  Abnormal study.  Scintigraphic evidence for mild ischemic in the         apical inferolateral wall.        Dilated left ventricle with normal LV systolic function, EF   72%.      Considering the quantitative measurements the study suggests an         intermediate risk study for adverse myocardial events.         TTE-5/10/2021:  TDS, optison used to better visualize endocardial border.       LVEF is 50-55%.      There is normal LV segmental wall motion.       Moderate concentric left ventricular hypertrophy.       The right ventricular systolic function is normal.  Trace mitral regurgitation.       Trace tricuspid regurgitation.        Assessment:  · Supine hypoxemia mostly from hypoventilation syndrome with some diastolic dysfunction which alone is not contributing to his supine hypoxemia. She does not have any symptoms associate with hypoxemia and also does not have any exertional dyspnea. I doubt that she has severe pulmonary hypertension if at all she may have mild pulmonary hypertension based on her symptoms and echo findings. · Super morbid obesity with possible obstructive sleep apnea  · Hypertension-stable  · Type 2 diabetes  · Pulmonary sarcoidosis  · Hypothyroidism HRT  · History of depression.           Plan:   · Right heart catheterization today to assess the cause of hypoxemia.   · Continue current medications  · Sleep study and treatment of nocturnal hypoxemia as per pulmonary service. · Further recommendation will be based on right heart catheterization results. Gerardo Rodriguez MD., Neli Shields.   [facility]) Cardiology

## 2021-06-18 VITALS
RESPIRATION RATE: 16 BRPM | OXYGEN SATURATION: 96 % | HEIGHT: 64 IN | DIASTOLIC BLOOD PRESSURE: 73 MMHG | WEIGHT: 293 LBS | TEMPERATURE: 97.8 F | HEART RATE: 88 BPM | SYSTOLIC BLOOD PRESSURE: 144 MMHG | BODY MASS INDEX: 50.02 KG/M2

## 2021-06-18 LAB — METER GLUCOSE: 124 MG/DL (ref 74–99)

## 2021-06-18 PROCEDURE — 82962 GLUCOSE BLOOD TEST: CPT

## 2021-06-18 PROCEDURE — 99239 HOSP IP/OBS DSCHRG MGMT >30: CPT | Performed by: INTERNAL MEDICINE

## 2021-06-18 PROCEDURE — 6360000002 HC RX W HCPCS: Performed by: FAMILY MEDICINE

## 2021-06-18 PROCEDURE — 2580000003 HC RX 258: Performed by: FAMILY MEDICINE

## 2021-06-18 PROCEDURE — 99233 SBSQ HOSP IP/OBS HIGH 50: CPT | Performed by: INTERNAL MEDICINE

## 2021-06-18 PROCEDURE — 2500000003 HC RX 250 WO HCPCS: Performed by: INTERNAL MEDICINE

## 2021-06-18 PROCEDURE — 99232 SBSQ HOSP IP/OBS MODERATE 35: CPT | Performed by: INTERNAL MEDICINE

## 2021-06-18 PROCEDURE — 6370000000 HC RX 637 (ALT 250 FOR IP): Performed by: FAMILY MEDICINE

## 2021-06-18 RX ADMIN — BUMETANIDE 1 MG: 0.25 INJECTION INTRAMUSCULAR; INTRAVENOUS at 03:56

## 2021-06-18 RX ADMIN — FOLIC ACID 1 MG: 1 TABLET ORAL at 09:52

## 2021-06-18 RX ADMIN — METFORMIN HYDROCHLORIDE 500 MG: 500 TABLET ORAL at 09:52

## 2021-06-18 RX ADMIN — ENOXAPARIN SODIUM 40 MG: 40 INJECTION SUBCUTANEOUS at 09:53

## 2021-06-18 RX ADMIN — LEVOTHYROXINE SODIUM 25 MCG: 25 TABLET ORAL at 09:52

## 2021-06-18 RX ADMIN — ACETAMINOPHEN 650 MG: 325 TABLET ORAL at 04:17

## 2021-06-18 RX ADMIN — BUMETANIDE 1 MG: 0.25 INJECTION INTRAMUSCULAR; INTRAVENOUS at 09:53

## 2021-06-18 RX ADMIN — LOSARTAN POTASSIUM 100 MG: 50 TABLET, FILM COATED ORAL at 09:52

## 2021-06-18 RX ADMIN — SODIUM CHLORIDE, PRESERVATIVE FREE 10 ML: 5 INJECTION INTRAVENOUS at 09:53

## 2021-06-18 RX ADMIN — ASPIRIN 81 MG: 81 TABLET, CHEWABLE ORAL at 09:52

## 2021-06-18 RX ADMIN — POTASSIUM CHLORIDE 10 MEQ: 750 TABLET, EXTENDED RELEASE ORAL at 09:53

## 2021-06-18 ASSESSMENT — PAIN SCALES - GENERAL: PAINLEVEL_OUTOF10: 8

## 2021-06-18 NOTE — PROGRESS NOTES
[facility] Hospitalist   Progress Note    Admitting Date and Time: 6/10/2021  1:43 AM  Admit Dx: Hypoxia [R09.02]    Subjective: Admitted on 10th with shortness of breath, patient with known sarcoidosis and diabetes, patient with negative cardiac work-up including cath and negative CTA. Non-smoker. Admitted for the management of acute hypoxic respiratory failure. Patient known to pulmonary, shortness of breath multifactorial, likely pulmonary hypertension, possible heart failure with preserved EF. Known hypothyroidism. Diuresed with Bumex. Patient with 9 L negative still remained hypoxic when right heart cath planned. Concern from cardiology of hypoventilation syndrome. Pulmonary considering to transfer to [facility]. Patient did undergo right heart catheterization, no intracardiac shunts, no pulmonary hypertension, normal RV and LV function on echocardiogram.  Patient stable from cardiac standpoint. Patient was admitted with Hypoxia [R09.02]. Patient feels comfortable, awake, alert, not happy as even though she was coming back for personal things were removed from her room. Especially things with sentimental value, some of the things which belonged to her daughter-the daughter she lost recently. Also not happy as CDC of supplemental oxygen is not settled. Per RN: Patient is planned for catheterization around 1 PM.  She is to come back here oxygen will be arranged and then she will be going home. ROS: denies fever, chills, cp, sob, n/v, HA unless stated above.      aspirin  81 mg Oral Daily    folic acid  1 mg Oral Daily    levothyroxine  25 mcg Oral Daily    losartan  100 mg Oral Daily    metFORMIN  500 mg Oral BID WC    potassium chloride  10 mEq Oral BID WC    sodium chloride flush  5-40 mL Intravenous 2 times per day    enoxaparin  40 mg Subcutaneous Daily    insulin lispro  0-12 Units Subcutaneous TID WC    insulin lispro  0-6 Units Subcutaneous Nightly    bumetanide  1 mg Intravenous Q6H     perflutren lipid microspheres, 1.5 mL, ONCE PRN  traMADol, 50 mg, Q8H PRN  sodium chloride flush, 5-40 mL, PRN  sodium chloride, 25 mL, PRN  ondansetron, 4 mg, Q8H PRN   Or  ondansetron, 4 mg, Q6H PRN  polyethylene glycol, 17 g, Daily PRN  acetaminophen, 650 mg, Q6H PRN   Or  acetaminophen, 650 mg, Q6H PRN  glucose, 15 g, PRN  dextrose, 12.5 g, PRN  glucagon (rDNA), 1 mg, PRN  dextrose, 100 mL/hr, PRN  albuterol, 2.5 mg, Q2H PRN         Objective:    BP (!) 144/73   Pulse 88   Temp 97.8 °F (36.6 °C) (Oral)   Resp 16   Ht 5' 4\" (1.626 m)   Wt 297 lb 1.6 oz (134.8 kg)   LMP 06/23/2018   SpO2 96%   BMI 51.00 kg/m²   General Appearance: alert and oriented to person, place and time, well-developed and well-nourished, in no acute distress  Skin: warm and dry, no rash or erythema  Head: normocephalic and atraumatic  Eyes: pupils equal, round, and reactive to light, extraocular eye movements intact, conjunctivae normal  ENT: tympanic membrane, external ear and ear canal normal bilaterally, oropharynx clear and moist with normal mucous membranes  Neck: neck supple and non tender without mass, no thyromegaly or thyroid nodules, no cervical lymphadenopathy   Pulmonary/Chest: clear to auscultation bilaterally- no wheezes, rales or rhonchi, normal air movement, no respiratory distress  Cardiovascular: normal rate, normal S1 and S2, no gallops, intact distal pulses and no carotid bruits  Abdomen: soft, non-tender, non-distended, normal bowel sounds, no masses or organomegaly      Recent Labs     06/16/21  0408 06/17/21  0905    137   K 3.8 4.8   CL 94* 95*   CO2 34* 33*   BUN 22* 16   CREATININE 0.8 0.7   GLUCOSE 128* 110*   CALCIUM 9.7 9.6       No results for input(s): WBC, RBC, HGB, HCT, MCV, MCH, MCHC, RDW, PLT, MPV in the last 72 hours. Radiology:   CTA PULMONARY W CONTRAST   Final Result   No evidence of acute pulmonary embolic disease.   Likely old granulomatous   disease as outlined above.             Assessment:    Active Problems:    Hypoxia  Resolved Problems:    * No resolved hospital problems. *      Plan:  1. Hypoxia, unclear etiology, suspected severe pulmonary hypertension-ruled out, pulmonary on board. 2. Right heart cath findings in normal limits  3. Obesity, explained at length weight loss. 4. Overnight pulse oximetry showing hypoxia, will need supplemental oxygen on DC. Patient not hypoxemic while walking, will DC only when pulmonary also on board. 5. Diabetes, on oral hypoglycemics, controlled.         Electronically signed by Garcia Branham MD on 6/18/2021 at 11:28 AM

## 2021-06-18 NOTE — PROGRESS NOTES
Secure message sent to Dr. Lamine Lamas re: discharge. PT okay for discharge with home O2 for sleep per Dr. Lamine Lamas.

## 2021-06-18 NOTE — PROGRESS NOTES
has OHS and most likley KISHORE but that would not cause 12 L unless associate with HFpEF or PHTN . Patient is negative 13 L so doubt her wedge will be high. I still believe she is combined Cardiac and KISHORE/OHS as cause of her PHTN     Staff will schedule sleep study as she will need BiPAP most likely     Will need either Trilogy or BiPAP  with her Co2 retention will see if she qualify     Bumex as per cardiology     Concern for RA as anti CCP 20    Weight is main factor in this case but KISHORE/Sarcoid /HfPEF all adding up    Budesonide   BD  Will follow as OP    Miley Patel MD,[facility]  Pulmonary&Critical Care Medicine   Director of [address] [place] [address] Director of [address] [place] [address]    Aminata Dillon    NOTE: This report was transcribed using voice recognition software. Every effort was made to ensure accuracy; however, inadvertent computerized transcription errors may be present.

## 2021-06-18 NOTE — CARE COORDINATION
Updated discharge plan of care. Pt will need nocturnal home o2. 2L/NC. Orders faxed to [facility] and will await confirmation of order. No other home care needs. Possible d/c today-O     Updated notes, [facility] cannot accept, information faxed to medical resources. Spoke with Corrina Camera at [phone]    [phone] Will deliver to home once patient is discharged.  Notified staff and pt of information-o

## 2021-06-18 NOTE — PROGRESS NOTES
INPATIENT CARDIOLOGY FOLLOW-UP    Name: Korina Castaneda    Age: 63 y.o. Date of Admission: 6/10/2021  1:43 AM    Date of Service: 6/18/2021    Chief Complaint: Follow-up for possible RHC    Interim History:  No new overnight cardiac complaints. Currently with no complaints of CP, SOB, palpitations, dizziness, or lightheadedness. Yesterday, she had right heart catheterization without any complications. SR on telemetry. Still hypoxic on  lying down on oxygen. , net almost - 12.3 L. Review of Systems:   Cardiac: As per HPI  General: No fever, chills  Pulmonary: As per HPI  HEENT: No visual disturbances, difficult swallowing  GI: No nausea, vomiting  Endocrine: No thyroid disease or DM  Musculoskeletal: VALLEJO x 4, no focal motor deficits  Skin: Intact, no rashes  Neuro/Psych: No headache or seizures    Problem List:  Patient Active Problem List   Diagnosis    Lung nodule    Class 3 obesity in adult    Essential hypertension    Sarcoidosis    Chronic pain syndrome    Chronic bilateral low back pain with bilateral sciatica    Lumbar radiculopathy    Left hip pain    Morbid obesity with BMI of 50.0-59.9, adult (HCC)    Primary osteoarthritis of left hip    CAD in native artery    Hypoxia    Acute respiratory failure with hypoxia (HCC)       Allergies:   Allergies   Allergen Reactions    Norco [Hydrocodone-Acetaminophen] Other (See Comments)     Patient reports having a warm flush feeling after taking Norco, similar to what she feels after having IV contrast.       Current Medications:  Current Facility-Administered Medications   Medication Dose Route Frequency Provider Last Rate Last Admin    perflutren lipid microspheres (DEFINITY) injection 1.65 mg  1.5 mL Intravenous ONCE PRN Syed Shaw MD        traMADol Alison Amen) tablet 50 mg  50 mg Oral Q8H PRN Jose Lay MD   50 mg at 06/17/21 5901    aspirin chewable tablet 81 mg  81 mg Oral Daily Russel Moeller MD   81 mg at 06/18/21 6488    folic acid (FOLVITE) tablet 1 mg  1 mg Oral Daily Killian Zambrano MD   1 mg at 06/18/21 7701    levothyroxine (SYNTHROID) tablet 25 mcg  25 mcg Oral Daily Killian Zambrano MD   25 mcg at 06/18/21 9717    losartan (COZAAR) tablet 100 mg  100 mg Oral Daily Killian Zambrano MD   100 mg at 06/18/21 4588    metFORMIN (GLUCOPHAGE) tablet 500 mg  500 mg Oral BID  Killian Zambrano MD   500 mg at 06/18/21 5454    potassium chloride (KLOR-CON M) extended release tablet 10 mEq  10 mEq Oral BID  Killian Zambrano MD   10 mEq at 06/18/21 0953    sodium chloride flush 0.9 % injection 5-40 mL  5-40 mL Intravenous 2 times per day Killian Zambrano MD   10 mL at 06/18/21 0953    sodium chloride flush 0.9 % injection 5-40 mL  5-40 mL Intravenous PRN Killian Zambrano MD   10 mL at 06/16/21 1118    0.9 % sodium chloride infusion  25 mL Intravenous PRN Killian Zambrano MD        enoxaparin (LOVENOX) injection 40 mg  40 mg Subcutaneous Daily Killian Zambrano MD   40 mg at 06/18/21 0953    ondansetron (ZOFRAN-ODT) disintegrating tablet 4 mg  4 mg Oral Q8H PRN Killian Zambrano MD        Or    ondansetron [facility]) injection 4 mg  4 mg Intravenous Q6H PRN Killian Zambrano MD   4 mg at 06/16/21 4982    polyethylene glycol (GLYCOLAX) packet 17 g  17 g Oral Daily PRN Killian Zambrano MD        acetaminophen (TYLENOL) tablet 650 mg  650 mg Oral Q6H PRN Killian Zambrano MD   650 mg at 06/18/21 5077    Or    acetaminophen (TYLENOL) suppository 650 mg  650 mg Rectal Q6H PRN Killian Zambrano MD        insulin lispro (HUMALOG) injection vial 0-12 Units  0-12 Units Subcutaneous TID  Killian Zambrano MD        insulin lispro (HUMALOG) injection vial 0-6 Units  0-6 Units Subcutaneous Nightly Killian Zambrano MD        glucose (GLUTOSE) 40 % oral gel 15 g  15 g Oral PRN Killian Zambrano MD        dextrose 50 % IV solution  12.5 g Intravenous PRN Killian Zambrano MD        glucagon (rDNA) injection 1 mg  1 mg Intramuscular PRN Mandy Jennings Rain Betts MD        dextrose 5 % solution  100 mL/hr Intravenous PRN Brenden Mann MD        albuterol (PROVENTIL) nebulizer solution 2.5 mg  2.5 mg Nebulization Q2H PRN Brenden Mann MD        [facility]) injection 1 mg  1 mg Intravenous Q6H Miley Nix MD   1 mg at 06/18/21 0953      sodium chloride      dextrose         Physical Exam:  BP (!) 144/73   Pulse 88   Temp 97.8 °F (36.6 °C) (Oral)   Resp 16   Ht 5' 4\" (1.626 m)   Wt 297 lb 1.6 oz (134.8 kg)   LMP 06/23/2018   SpO2 96%   BMI 51.00 kg/m²   Wt Readings from Last 3 Encounters:   06/17/21 297 lb 1.6 oz (134.8 kg)   05/12/21 299 lb (135.6 kg)   06/23/20 300 lb (136.1 kg)     Appearance: Awake, alert, no acute respiratory distress  Skin: Intact, no rash  Head: Normocephalic, atraumatic  Eyes: EOMI, no conjunctival erythema  ENMT: No pharyngeal erythema, MMM, no rhinorrhea  Neck: Supple, no elevated JVP, no carotid bruits. Right side of the neck looks clean and no hematoma at the side of RHC. Lungs: Clear to auscultation bilaterally. No wheezes, rales, or rhonchi. Cardiac: Regular rate and rhythm, +S1S2, no murmurs apparent  Abdomen: Soft, nontender, +bowel sounds  Extremities: Moves all extremities x 4, no lower extremity edema  Neurologic: No focal motor deficits apparent, normal mood and affect  Peripheral Pulses: Intact posterior tibial pulses bilaterally    Intake/Output:  No intake or output data in the 24 hours ending 06/18/21 1049  No intake/output data recorded. Laboratory Tests:  Recent Labs     06/16/21  0408 06/17/21  0905    137   K 3.8 4.8   CL 94* 95*   CO2 34* 33*   BUN 22* 16   CREATININE 0.8 0.7   GLUCOSE 128* 110*   CALCIUM 9.7 9.6     Lab Results   Component Value Date    MG 2.2 06/06/2021     No results for input(s): ALKPHOS, ALT, AST, PROT, BILITOT, BILIDIR, LABALBU in the last 72 hours. No results for input(s): WBC, RBC, HGB, HCT, MCV, MCH, MCHC, RDW, PLT, MPV in the last 72 hours.   Lab Results Component Value Date    TROPONINI < 0.015 06/05/2021    TROPONINI < 0.015 06/05/2021    TROPONINI < 0.015 06/05/2021     Lab Results   Component Value Date    INR 1.0 (L) 06/05/2021    INR 1.0 (L) 05/09/2021    INR 1.0 (L) 04/29/2021    PROTIME 9.9 06/05/2021    PROTIME 10.2 05/09/2021    PROTIME 9.8 04/29/2021     Lab Results   Component Value Date    TSH 2.420 06/06/2021     Lab Results   Component Value Date    LABA1C 6.9 (H) 06/10/2021     Lab Results   Component Value Date     06/06/2021     Lab Results   Component Value Date    CHOL 209 (H) 03/11/2021    CHOL 175 03/02/2020    CHOL 195 11/06/2019     Lab Results   Component Value Date    TRIG 255 (H) 03/11/2021    TRIG 156 (H) 03/02/2020    TRIG 144 11/06/2019     Lab Results   Component Value Date    HDL 29 (L) 03/11/2021    HDL 30 (L) 03/02/2020    HDL 34 (L) 11/06/2019     Lab Results   Component Value Date    LDLCALC 132 05/20/2018    LDLCALC 91 12/31/2015    LDLCHOLESTEROL 129 03/11/2021    LDLCHOLESTEROL 114 03/02/2020    LDLCHOLESTEROL 132 11/06/2019     Lab Results   Component Value Date    VLDL 51 (H) 03/11/2021    VLDL 31 03/02/2020    VLDL 29 11/06/2019     No results found for: CHOLHDLRATIO    Cardiac Tests:  Telemetry findings reviewed: SR at rate 80s, no new tachy/bradyarrhythmias overnight     EKG: Normal sinus rhythm, normal EKG.      Vitals and labs were reviewed: Blood pressure 144/73 heart rate 88 sats 95 % on 2 L of O2.     Labs-BUN/creatinine 20/1.7>> 22/0.8, rest of the electrolytes are normal.  Liver function normal.  Labs for today are pending.     Left heart catheterization-5/12/2021:  Hemodynamics:  Opening Aortic BSSGHZGK: 892/51  LV systolic WVXKVEDK: 457  XYKZO: 18  No significant gradient across the aortic valve     Angiographic Results/findings:  Left Main: No angiographically significant stenosis  LAD: Tortuous.  No angiographically significant stenosis  D1: Bifurcating vessel.  No angiographically significant stenosis. D2: Small vessel. Cx: Tortuous.  No angiographically significant stenosis. OM1: No angiographically significant stenosis. Ramus: No angiographically significant stenosis. RCA: Huge vessel with anterior takeoff.  Hyper-dominant.  No angiographically significant stenosis. PDA: No angiographically significant stenosis. PLB: No angiographically significant stenosis. LV: Normal LV size and systolic function.  No wall motion abnormalities.  Ejection fraction 50%     Lexiscan nuclear stress test-5/10/2021:  Abnormal study.  Scintigraphic evidence for mild ischemic in the         apical inferolateral wall.        Dilated left ventricle with normal LV systolic function, EF   36%.      Considering the quantitative measurements the study suggests an         intermediate risk study for adverse myocardial events.         TTE-5/10/2021:  TDS, optison used to better visualize endocardial border.       LVEF is 50-55%.      There is normal LV segmental wall motion.       Moderate concentric left ventricular hypertrophy.       The right ventricular systolic function is normal.  Trace mitral regurgitation.       Trace tricuspid regurgitation. RHC- 6/17/2021:   Hemodynamics:  RA: 0  RV systolic: 39  RV diastolic 0  Wedge mean: 1  PA: 38/8, mean PA pressure: 18 mmHg     Sats:   RA: 71.2  RV: 72.3  Wedge: 72.8  PA: 71.5     Cardiac output  Thermo: 6.62  Kristin: 5.18     Cardiac index: Thermo: 2.9  Kristin: 2.3        Assessment:  · Supine hypoxemia mostly from hypoventilation syndrome, no evidence of any cardiac causes based on echo, RHC and LHC. · Super morbid obesity with possible obstructive sleep apnea  · Hypertension-stable  · Type 2 diabetes  · Pulmonary sarcoidosis  · Hypothyroidism HRT  · History of depression.           Plan:   · Right heart catheterization results reviewed, no intra cardiac shunts, no pulmonary hypertension. Echo showed normal RV and LV function. LHC no CAD.     · Continue current medications  · Sleep study and treatment of nocturnal hypoxemia as per pulmonary service. · She is stable from the cardiology standpoint, follow up as needed. Will sign off, please call us if you have any further questions or concerns. Ofelia Edwards MD., Karla Martínez.   [facility]) Cardiology

## 2021-06-18 NOTE — DISCHARGE SUMMARY
[facility] EMERGENCY SERVICE Physician Discharge Summary       No follow-up provider specified. Activity level: As tolerated    Diet: ADULT DIET; Regular    Labs:    Dispo: Home    Condition at discharge: Stable    Continue supplemental oxygen via nasal canula @ 2 LPM round-the-clock. Continue CPAP / BiPAP during sleep as prior to admission. Patient ID:  Edmundo Mcdonough  [id number]  74 y.o.  1965    Admit date: 6/10/2021    Discharge date and time:  6/18/2021  12:19 PM    Admission Diagnoses: Active Problems:    Hypoxia  Resolved Problems:    * No resolved hospital problems. *      Discharge Diagnoses: Active Problems:    Hypoxia  Resolved Problems:    * No resolved hospital problems. *      Consults:  IP CONSULT TO PULMONOLOGY  IP CONSULT TO CARDIOLOGY  IP CONSULT TO IV TEAM  IP CONSULT TO IV TEAM    Procedures: Right heart cath    Hospital Course: Patient was admitted with Hypoxia [R09.02]. Patient Admitted on 10th with shortness of breath, patient with known sarcoidosis and diabetes, patient with negative cardiac work-up including cath and negative CTA. Non-smoker. Admitted for the management of acute hypoxic respiratory failure. Patient known to pulmonary, shortness of breath multifactorial, likely pulmonary hypertension, possible heart failure with preserved EF. Known hypothyroidism. Diuresed with Bumex. Patient with 9 L negative still remained hypoxic when right heart cath planned. Concern from cardiology of hypoventilation syndrome. Pulmonary considering to [facility] to [facility]. Patient did undergo right heart catheterization, no intracardiac shunts, no pulmonary hypertension, normal RV and LV function on echocardiogram.  Patient stable from cardiac standpoint. Nursing verified with pulmonary so far, set up is complete for patient to be able to get her oxygen at home she is okay for discharge.   Patient at this time is fully awake, alert, comfortable, will continue with DC process as planned. Discharge Exam:  Vitals:    06/17/21 0815 06/17/21 2114 06/17/21 2130 06/18/21 0945   BP: (!) 115/53 (!) 120/58  (!) 144/73   Pulse: 86 86  88   Resp: 18 18  16   Temp: 98.2 °F (36.8 °C) 98.1 °F (36.7 °C)  97.8 °F (36.6 °C)   TempSrc: Oral Oral  Oral   SpO2: 95% (!) 88% 95% 96%   Weight:       Height:           General Appearance: alert and oriented to person, place and time, well-developed and well-nourished, in no acute distress  Skin: warm and dry, no rash or erythema  Head: normocephalic and atraumatic  Eyes: pupils equal, round, and reactive to light, extraocular eye movements intact, conjunctivae normal  ENT: tympanic membrane, external ear and ear canal normal bilaterally, oropharynx clear and moist with normal mucous membranes  Neck: neck supple and non tender without mass, no thyromegaly or thyroid nodules, no cervical lymphadenopathy   Pulmonary/Chest: clear to auscultation bilaterally- no wheezes, rales or rhonchi, normal air movement, no respiratory distress  Cardiovascular: normal rate, normal S1 and S2, no gallops, intact distal pulses and no carotid bruits  Abdomen: soft, non-tender, non-distended, normal bowel sounds, no masses or organomegaly  No intake/output data recorded. No intake/output data recorded. LABS:  Recent Labs     06/16/21  0408 06/17/21  0905    137   K 3.8 4.8   CL 94* 95*   CO2 34* 33*   BUN 22* 16   CREATININE 0.8 0.7   GLUCOSE 128* 110*   CALCIUM 9.7 9.6       Imaging:   CTA PULMONARY W CONTRAST   Final Result   No evidence of acute pulmonary embolic disease. Likely old granulomatous   disease as outlined above.              Patient Instructions:      Medication List      CONTINUE taking these medications    aspirin 81 MG tablet     folic acid 1 MG tablet  Commonly known as: FOLVITE  Take 1 tablet by mouth daily     furosemide 40 MG tablet  Commonly known as: LASIX  Take 1 tablet by mouth 2 times daily     levothyroxine 25 MCG tablet  Commonly known as: SYNTHROID  Take 1 tablet by mouth daily     losartan 100 MG tablet  Commonly known as: COZAAR  Take 1 tablet by mouth daily     metFORMIN 500 MG tablet  Commonly known as: GLUCOPHAGE  Take 1 tablet by mouth 2 times daily (with meals)     potassium chloride 10 MEQ extended release tablet  Commonly known as: KLOR-CON  Take 1 tablet by mouth 2 times daily     vitamin D 1.25 MG (10021 UT) Caps capsule  Commonly known as: ERGOCALCIFEROL  Take 1 capsule by mouth once a week              Note that more than 30 minutes was spent in preparing discharge papers, discussing discharge with patient, medication review, etc.    Signed:  Electronically signed by Monica Encarnacion MD on 6/18/2021 at 12:19 PM    NOTE: This report was transcribed using voice recognition software. Every effort was made to ensure accuracy; however, inadvertent computerized transcription errors may be present.

## 2021-06-18 NOTE — PROGRESS NOTES
[facility] Quality Flow/Interdisciplinary Rounds Progress Note        Quality Flow Rounds held on June 18, 2021    Disciplines Attending:  Bedside Nurse, ,  and Nursing Unit Leadership    Key Wilson was admitted on 6/10/2021  1:43 AM    Anticipated Discharge Date:  Expected Discharge Date: 06/18/21    Disposition:    Vel Score:  Vel Scale Score: 21    Readmission Risk              Risk of Unplanned Readmission:  10           Discussed patient goal for the day, patient clinical progression, and barriers to discharge. The following Goal(s) of the Day/Commitment(s) have been identified:  ambulating pulse ox, discharge planning.        Heidi Troncoso RN  June 18, 2021

## 2021-06-18 NOTE — PROGRESS NOTES
Pt. At rest 95% on room air. Pt. During exertion 91% on room air.     No oxygen required during assessment

## 2021-06-21 ENCOUNTER — TELEPHONE (OUTPATIENT)
Dept: CARDIOLOGY CLINIC | Age: 56
End: 2021-06-21

## 2021-06-21 ENCOUNTER — TELEPHONE (OUTPATIENT)
Dept: PRIMARY CARE CLINIC | Age: 56
End: 2021-06-21

## 2021-06-21 NOTE — TELEPHONE ENCOUNTER
[place] [facility] Trans[facility] Initial Follow Up Call    Outreach made within 2 business days of discharge: Yes    Patient: Susan Freedman Patient : 1965   MRN: [id number]  Reason for Admission: There are no discharge diagnoses documented for the most recent discharge. Discharge Date: 21       Spoke with: Noble Soriano    Discharge department/facility: home    TCM Interactive Patient Contact:  Was patient able to fill all prescriptions: No: none given  Was patient instructed to bring all medications to the follow-up visit: Yes  Is patient taking all medications as directed in the discharge summary? Yes  Does patient understand their discharge instructions: Yes  Does patient have questions or concerns that need addressed prior to 7-14 day follow up office visit: yes - Noble Soriano said that Dr. Franklyn Guillermo was changing water pill, but did not get a prescription. She is calling his office today. Scheduled appointment with PCP within 7-14 days    Daeugene Soriano will call to make her own appointments.      Follow Up  Future Appointments   Date Time Provider Grecia Recinos   2021 10:45 AM CONSTANTINO Han CNP [facility]   2021 11:30 AM mL Almendarez, [address] [facility]       Sid Gallegos

## 2021-06-23 ENCOUNTER — TELEPHONE (OUTPATIENT)
Dept: SLEEP CENTER | Age: 56
End: 2021-06-23

## 2021-06-23 ENCOUNTER — TELEPHONE (OUTPATIENT)
Dept: ADMINISTRATIVE | Age: 56
End: 2021-06-23

## 2021-06-23 NOTE — TELEPHONE ENCOUNTER
Est pt has appts coming up in June ([place]row/Raimundo) & July (Kun/Galen) for sinus issues. Pt states she \"doesn't want to see Dr. Linsey Fisher anymore, b/c of recommendations from other people. \"  Please call patient to discuss further at [phone].

## 2021-06-23 NOTE — TELEPHONE ENCOUNTER
Called patient to discuss upcoming appointment. Patient would like to see Dr. Andi Montes for nasal blockage. Received [facility] procedure record from patient's cerebral angiogram procedure on 12- with Dr Genoveva Silveira. Follow up apt scheduled for 2-8-21. Copy of record made and sent to scanning. Placed in [facility] procedure records binder.

## 2021-06-28 ENCOUNTER — TELEPHONE (OUTPATIENT)
Dept: ADMINISTRATIVE | Age: 56
End: 2021-06-28

## 2021-06-29 NOTE — PROCEDURES
[address] [address]                               PULMONARY FUNCTION    PATIENT NAME: Caden Hay                      :        1965  MED REC NO:   [id number]                            ROOM:       [id number]  ACCOUNT NO:   [de-identified]                           ADMIT DATE: 06/10/2021  PROVIDER:     Gillian Carmona MD    DATE OF PROCEDURE:  06/15/2021    OVERNIGHT PULSE OXIMETER    Recording time 11 hours 25 minutes 44 seconds. Excluding time 18  minutes 56 seconds. Total time valid 11 hours 6 minutes 48 seconds. Highest pulse 92. Lowest pulse is 64. Mean pulse 75. Highest  saturation 99, lowest is 82. Time with saturation less than 90 is 1  hour 46 minutes 48 seconds. Time with saturation less than 80 is 9  minutes 36 seconds. Time with saturation less than 70 is 48 seconds. Time with saturation less than 88 is about 50 minutes. IMPRESSION:  This overnight pulse oximeter is showing that the patient  has severe nocturnal desaturation that she needs to be started on oxygen  and we recommended that she start in 3 to 4 L and then we need full  sleep study for the patient. The patient had 21 desaturation events,  the longest duration was 16 minutes, that means she will benefit from  overnight oxygen. Clinical and radiological correlation indicated.         Claudia Vaca MD    D: 2021 9:35:48       T: 2021 11:00:14     MA/KURT  Job#: [id number]     Doc#: [id number]    CC:    Jeremiah Thibodeaux DO

## 2021-07-06 PROCEDURE — 94726 PLETHYSMOGRAPHY LUNG VOLUMES: CPT | Performed by: INTERNAL MEDICINE

## 2021-07-15 RX ORDER — ERGOCALCIFEROL 1.25 MG/1
50000 CAPSULE ORAL WEEKLY
Qty: 12 CAPSULE | Refills: 0 | Status: SHIPPED
Start: 2021-07-15 | End: 2021-10-04

## 2021-07-29 ENCOUNTER — OFFICE VISIT (OUTPATIENT)
Dept: CARDIOLOGY CLINIC | Age: 56
End: 2021-07-29
Payer: MEDICAID

## 2021-07-29 VITALS
WEIGHT: 293 LBS | BODY MASS INDEX: 50.02 KG/M2 | HEIGHT: 64 IN | SYSTOLIC BLOOD PRESSURE: 124 MMHG | HEART RATE: 86 BPM | DIASTOLIC BLOOD PRESSURE: 78 MMHG | RESPIRATION RATE: 16 BRPM

## 2021-07-29 DIAGNOSIS — I25.10 CAD IN NATIVE ARTERY: Primary | ICD-10-CM

## 2021-07-29 PROCEDURE — G8417 CALC BMI ABV UP PARAM F/U: HCPCS | Performed by: INTERNAL MEDICINE

## 2021-07-29 PROCEDURE — G8427 DOCREV CUR MEDS BY ELIG CLIN: HCPCS | Performed by: INTERNAL MEDICINE

## 2021-07-29 PROCEDURE — 93000 ELECTROCARDIOGRAM COMPLETE: CPT | Performed by: INTERNAL MEDICINE

## 2021-07-29 PROCEDURE — 1036F TOBACCO NON-USER: CPT | Performed by: INTERNAL MEDICINE

## 2021-07-29 PROCEDURE — 3017F COLORECTAL CA SCREEN DOC REV: CPT | Performed by: INTERNAL MEDICINE

## 2021-07-29 PROCEDURE — 99214 OFFICE O/P EST MOD 30 MIN: CPT | Performed by: INTERNAL MEDICINE

## 2021-07-29 RX ORDER — BUMETANIDE 1 MG/1
1 TABLET ORAL 2 TIMES DAILY
Qty: 30 TABLET | Refills: 3 | Status: SHIPPED | OUTPATIENT
Start: 2021-07-29

## 2021-07-29 NOTE — PROGRESS NOTES
OUTPATIENT CARDIOLOGY FOLLOW-UP    Name: Renetta Merida    Age: 63 y.o. Primary Care Physician: Maria E Owusu DO    Date of Service: 7/29/2021    Chief Complaint:   Chief Complaint   Patient presents with    Follow-Up from Hospital        Interim History:   Here for hospital follow-up. Known to me from [facility] from admission in May 2021 where she presented with chest pain. Stress test was abnormal and she was sent to [facility] for left heart catheterization which revealed normal coronary arteries. She is subsequently admitted in June with hypoxemia especially in the supine position. She was diuresed about 13 L and also underwent right heart catheterization which revealed no evidence of pulmonary hypertension. She was started on nocturnal oxygen and is supposed to have a sleep study which has not yet been done. States she is gained 25 pounds over the past year. She has been depressed since the death of her daughter. She has had limited mobility since her knee surgery.     Review of Systems:   Negative except as described above    Past Medical History:  Past Medical History:   Diagnosis Date    CAD in native artery 5/12/2021    Depression     Diabetes mellitus ([address]     Hypertension     Obesity     bmi 45.5 weight 5 #    Osteoarthritis     Sarcoidosis of lung (HCC)     SOB (shortness of breath)     Thyroid disease        Past Surgical History:  Past Surgical History:   Procedure Laterality Date    BRONCHOSCOPY  01/24/2018    Dr. Liz Kirkpatrick  06/17/2021    Dr. Trey Raymond- [facility]         Family History:  Family History   Problem Relation Age of Onset    Diabetes Mother     Coronary Art Dis Brother        Social History:  Social History     Tobacco Use    Smoking status: Never Smoker    Smokeless tobacco: Never Used   Vaping Use    Vaping Use: Never used   Substance Use Topics    Alcohol use: No    Drug use: No        Allergies: Allergies   Allergen Reactions    Norco [Hydrocodone-Acetaminophen] Other (See Comments)     Patient reports having a warm flush feeling after taking Norco, similar to what she feels after having IV contrast.       Current Medications:    Current Outpatient Medications:     bumetanide (BUMEX) 1 MG tablet, Take 1 tablet by mouth 2 times daily, Disp: 30 tablet, Rfl: 3    vitamin D (ERGOCALCIFEROL) 1.25 MG (92242 UT) CAPS capsule, Take 1 capsule by mouth once a week, Disp: 12 capsule, Rfl: 0    metFORMIN (GLUCOPHAGE) 500 MG tablet, Take 1 tablet by mouth 2 times daily (with meals), Disp: 60 tablet, Rfl: 5    losartan (COZAAR) 100 MG tablet, Take 1 tablet by mouth daily, Disp: 30 tablet, Rfl: 5    potassium chloride (KLOR-CON) 10 MEQ extended release tablet, Take 1 tablet by mouth 2 times daily, Disp: 60 tablet, Rfl: 2    folic acid (FOLVITE) 1 MG tablet, Take 1 tablet by mouth daily, Disp: 90 tablet, Rfl: 1    levothyroxine (SYNTHROID) 25 MCG tablet, Take 1 tablet by mouth daily, Disp: 30 tablet, Rfl: 5    aspirin 81 MG tablet, Take 81 mg by mouth daily, Disp: , Rfl:     Physical Exam:  /78   Pulse 86   Resp 16   Ht 5' 4\" (1.626 m)   Wt (!) 304 lb 1.6 oz (137.9 kg)   LMP 06/23/2018   BMI 52.20 kg/m²   Wt Readings from Last 3 Encounters:   07/29/21 (!) 304 lb 1.6 oz (137.9 kg)   06/17/21 297 lb 1.6 oz (134.8 kg)   05/12/21 299 lb (135.6 kg)     Appearance: Morbidly obese, awake, alert and oriented x 3, no acute respiratory distress  Skin: Intact, no rash  Head: Normocephalic, atraumatic  Eyes: EOMI, no conjunctival erythema  ENMT: No pharyngeal erythema, MMM, no rhinorrhea  Neck: Supple, no elevated JVP, no carotid bruits  Lungs: Clear to auscultation bilaterally. No wheezes, rales, or rhonchi.   Cardiac: Regular rate and rhythm, +S1S2, no murmurs apparent  Abdomen: Soft, nontender, +bowel sounds  Extremities: Moves all extremities x 4, trace lower extremity edema  Neurologic: No focal motor deficits apparent, normal mood and affect, alert and oriented x 3  Peripheral Pulses: Intact posterior tibial pulses bilaterally    Laboratory Tests:  Lab Results   Component Value Date    CREATININE 0.7 06/17/2021    BUN 16 06/17/2021     06/17/2021    K 4.8 06/17/2021    CL 95 (L) 06/17/2021    CO2 33 (H) 06/17/2021     Lab Results   Component Value Date    MG 2.2 06/06/2021     Lab Results   Component Value Date    WBC 10.8 06/11/2021    HGB 13.3 06/11/2021    HCT 44.4 06/11/2021    MCV 92.3 06/11/2021     06/11/2021     Lab Results   Component Value Date    ALT 21 06/14/2021    AST 19 06/14/2021    ALKPHOS 100 06/14/2021    BILITOT 0.4 06/14/2021     Lab Results   Component Value Date    TROPONINI < 0.015 06/05/2021    TROPONINI < 0.015 06/05/2021    TROPONINI < 0.015 06/05/2021     Lab Results   Component Value Date    INR 1.0 (L) 06/05/2021    INR 1.0 (L) 05/09/2021    INR 1.0 (L) 04/29/2021    PROTIME 9.9 06/05/2021    PROTIME 10.2 05/09/2021    PROTIME 9.8 04/29/2021     Lab Results   Component Value Date    TSH 2.420 06/06/2021     Lab Results   Component Value Date    LABA1C 6.9 (H) 06/10/2021     Lab Results   Component Value Date     06/06/2021     Lab Results   Component Value Date    CHOL 209 (H) 03/11/2021    CHOL 175 03/02/2020    CHOL 195 11/06/2019     Lab Results   Component Value Date    TRIG 255 (H) 03/11/2021    TRIG 156 (H) 03/02/2020    TRIG 144 11/06/2019     Lab Results   Component Value Date    HDL 29 (L) 03/11/2021    HDL 30 (L) 03/02/2020    HDL 34 (L) 11/06/2019     Lab Results   Component Value Date    LDLCALC 132 05/20/2018    LDLCALC 91 12/31/2015    LDLCHOLESTEROL 129 03/11/2021    LDLCHOLESTEROL 114 03/02/2020    LDLCHOLESTEROL 132 11/06/2019     Lab Results   Component Value Date    VLDL 51 (H) 03/11/2021    VLDL 31 03/02/2020    VLDL 29 11/06/2019     No results found for: CHOLHDLRATIO  No results for input(s): PROBNP in the last 72 hours. Cardiac Tests:  EC2021: Sinus rhythm 86 bpm.  Normal axis normal intervals. Low voltage. CT chest 6/10/2020  Impression   No evidence of acute pulmonary embolic disease.  Likely old granulomatous   disease as outlined above. Echocardiogram:   TTE 2021     Summary   Normal left ventricle size and systolic function. Ejection fraction is visually estimated at 55-60%. No regional wall motion abnormalities seen. Normal left ventricle wall thickness. Indeterminate diastolic function. Mildly dilated right ventricle. Normal right ventricular function. TAPSE 25 cm. No systolic mitral regurgitation noted. No hemodynamically significant aortic stenosis is present. Unable to estimate PA systolic pressure. No evidence for hemodynamically significant pericardial effusion. Stress test:    Pharmacologic stress 2021  Conclusion        Abnormal study.  Scintigraphic evidence for mild ischemic in the         apical inferolateral wall.        Dilated left ventricle with normal LV systolic function, EF         56%.      Considering the quantitative measurements the study suggests an         intermediate risk study for adverse myocardial events.         No prior study available for comparison. Cardiac catheterization:   Right heart catheterization 2021 Mervin Genre    Hemodynamics:   RA: 0   RV systolic: 39   RV diastolic 0   Wedge mean: 1   PA: 38/8     Sats:   RA: 71.2   RV: 72.3   Wedge: 72.8   PA: 71.5     Cardiac output   Thermo: 6.62   Kristin: 5.18     Cardiac index: Thermo: 2.9   Kristin: 2.3       Left heart catheterization 2021 Mervin Genre  Hemodynamics:  Opening Aortic pressure: 394/54  LV systolic pressure: 724  LVEDP: 24  No significant gradient across the aortic valve     Angiographic Results/findings:  Left Main: No angiographically significant stenosis  LAD: Tortuous. No angiographically significant stenosis  D1: Bifurcating vessel.   No angiographically significant stenosis. D2: Small vessel. Cx: Tortuous. No angiographically significant stenosis. OM1: No angiographically significant stenosis. Ramus: No angiographically significant stenosis. RCA: Huge vessel with anterior takeoff. Hyper-dominant. No angiographically significant stenosis. PDA: No angiographically significant stenosis. PLB: No angiographically significant stenosis. LV: Normal LV size and systolic function. No wall motion abnormalities. Ejection fraction 50%    Orders Placed This Encounter   Procedures    EKG 12 lead        Requested Prescriptions     Signed Prescriptions Disp Refills    bumetanide (BUMEX) 1 MG tablet 30 tablet 3     Sig: Take 1 tablet by mouth 2 times daily        ASSESSMENT / PLAN:  1. Chronic HFpEF  2. Supine hypoxemia, multifactorial currently on nocturnal O2  3. Normal right and left heart cath (wedge was normal after diuresis on right heart cath; LVEDP was elevated at left heart cath)  4. Morbid obesity BMI 52.2 kg/m²  5. KISHORE/OHS, untreated  6. Pulmonary sarcoidosis  7. Hypothyroidism  8. Depression    Recommendations:    · Change furosemide to bumetanide 1 mg twice daily  · Recommend she follow-up with the outpatient sleep study that was recommended  · Follow-up with pulmonary  · Aggressive risk factor modification including weight loss recommended  · Follow-up in 3 months or sooner if need arises    The patient's current medication list, allergies, problem list and results of all previously ordered testing were reviewed at today's visit.     Hyacinth Rangel MD, [address] [place] [address] Cardiology

## 2021-07-30 NOTE — PROGRESS NOTES
Physician Progress Note      Genia Ovalle  [facility] #:                  [id number]  :                       1965  ADMIT DATE:       6/10/2021 1:43 AM  DISCH DATE:        2021 6:59 PM  RESPONDING  PROVIDER #:        Rich Anguiano MD          QUERY TEXT:    Dr. Jarad Reyes,    Patient admitted with shortness of breath. Noted documentation of acute   respiratory failure in the  discharge summary. In order to support the   diagnosis of acute respiratory failure, please include additional clinical   indicators in your documentation. Or please document if the diagnosis of   acute respiratory failure has been ruled out after further study. The medical record reflects the following:  Risk Factors: HTN, obesity, sarcoidosis  Clinical Indicators: per the 6/15 coding query response \"After study, hypoxia   confirmed and acute respiratory failure ruled out. \" per the  discharge   summary \"Admitted for the management of acute hypoxic respiratory failure. \",   documented respiration 16-20, IM states \"no wheezes, rales or rhonchi, normal   air movement, no respiratory distress\" unlabored breathing documented by   nursing staff  Treatment: supplemental O2, sleep study, PTFs, IV Bumex, RHC    Thank you,  Yesi Jacy  Clinical Documentation Improvement  [phone]    Acute Respiratory Failure Clinical Indicators per [employer] Training Guide and   Quick Reference Guide:  pO2 < 60 mmHg or SpO2 (pulse oximetry) < 91% breathing room air  pCO2 > 50 and pH < 7.35  P/F ratio (pO2 / FIO2) < 300  pO2 decrease or pCO2 increase by 10 mmHg from baseline (if known)  Supplemental oxygen of 40% or more  Presence of respiratory distress, tachypnea, dyspnea, shortness of breath,   wheezing  Unable to speak in complete sentences  Use of accessory muscles to breathe  Extreme anxiety and feeling of impending doom  Tripod position  Confusion/altered mental status/obtunded  Options provided:  -- Acute Respiratory Failure as evidenced by, Please document evidence.   -- Acute Respiratory Failure ruled out after study  -- Other - I will add my own diagnosis  -- Disagree - Not applicable / Not valid  -- Disagree - Clinically unable to determine / Unknown  -- Refer to Clinical Documentation Reviewer    PROVIDER RESPONSE TEXT:    This patient is in acute respiratory failure as evidenced by Reducible   hypoxia, more due to severe KISHORE, as noted desaturations on nocturnal oximetry,   complicated with heart failure with preserved EF, worsening of asthma with   volume overload    Query created by: Rozina Sandoval on 7/30/2021 9:53 AM      Electronically signed by:  Brian Levi MD 7/30/2021 5:17 PM

## 2021-08-06 ENCOUNTER — VIRTUAL VISIT (OUTPATIENT)
Dept: PULMONOLOGY | Age: 56
End: 2021-08-06
Payer: MEDICAID

## 2021-08-06 DIAGNOSIS — D86.9 SARCOID: ICD-10-CM

## 2021-08-06 DIAGNOSIS — R06.02 SOB (SHORTNESS OF BREATH): Primary | ICD-10-CM

## 2021-08-06 NOTE — PROGRESS NOTES
capsule Take 1 capsule by mouth once a week 12 capsule 0    metFORMIN (GLUCOPHAGE) 500 MG tablet Take 1 tablet by mouth 2 times daily (with meals) 60 tablet 5    losartan (COZAAR) 100 MG tablet Take 1 tablet by mouth daily 30 tablet 5    potassium chloride (KLOR-CON) 10 MEQ extended release tablet Take 1 tablet by mouth 2 times daily 60 tablet 2    folic acid (FOLVITE) 1 MG tablet Take 1 tablet by mouth daily 90 tablet 1    levothyroxine (SYNTHROID) 25 MCG tablet Take 1 tablet by mouth daily 30 tablet 5    aspirin 81 MG tablet Take 81 mg by mouth daily       No current facility-administered medications for this visit. SOCIAL AND OCCUPATIONAL HEALTH:  Social History     Tobacco Use   Smoking Status Never Smoker   Smokeless Tobacco Never Used     TB : No  Pets :No  Industrial exposure:No   Birds :No     SURGICAL HISTORY:   Past Surgical History:   Procedure Laterality Date    BRONCHOSCOPY  01/24/2018    Dr. Kannan Celeste  [facility] H[facility]:   Lung cancer:no   DVT or PE no     REVIEW OF SYSTEMS:  Constitutional: General health is good . There has been no weight changes. No fevers, fatigue or weakness. Head: Patient denies any history of trauma, convulsive disorder or syncope. Skin:  Patient denies history of changes in pigmentation, eruptions or pruritus. No easy bruising or bleeding. EENT: no nasal congestion   Cardiovascular ,No chest pain ,No edema ,  Respiration:SOB:+,LIU :+  Gastrointestinal:No GI bleed ,no melena  ,no hematemesis    Musculoskeletal: no joint pain ,no swelling  Neurological:no , syncope. Denies twitching, convulsions, loss of consciousness or memory. Endocrine:  . No history of goiter, exophthalmos or dryness of skin. The patient has no history of diabetes. Hematopoietic:  No history of bleeding disorders or easy bruising.   Rheumatic:  No connective tissue disease in this case but KISHORE/Sarcoid /HfPEF all adding up   May consider PET scan if symptoms continue to assess if any activity of her sarcoid      Tim Roa MD  Pulmonary & Critical Care Medicine     NOTE: This report was transcribed using voice recognition software. Every effort was made to ensure accuracy; however, inadvertent computerized transcription errors may be present.

## 2021-08-06 NOTE — PROGRESS NOTES
TeleMedicine Video Visit    Saad Zimmer, was evaluated through a synchronous (real-time) audio-video encounter. The patient (or guardian if applicable) is aware that this is a billable service. Verbal consent to proceed has been obtained within the past 12 months. The visit was conducted pursuant to the emergency declaration under the [facility], [address] [place] [address] and the [employer] and [employer] General Act. Patient identification was verified, and a caregiver was present when appropriate. The patient was located in a state where the provider was credentialed to provide care. Patient identification was verified at the start of the visit, including the patient's telephone number and physical location. I discussed with the patient the nature of our telehealth visits, that:     1. Due to the nature of an audio- video modality, the only components of a physical exam that could be done are the elements supported by direct observation. 2. I would evaluate the patient and recommend diagnostics and treatments based on my assessment. 3. If it was felt that the patient should be evaluated in clinic or an emergency room setting, then they would be directed there. 4. Our sessions are not being recorded and that personal health information is protected. 5. Our team would provide follow up care in person if/when the patient needs it. Patient's location: home address in [facility]  Physician  location other address in [facility] other people involved in call        On this date 8/6/2021 I have spent 15 minutes reviewing previous notes, test results and face to face (virtual) with the patient discussing the diagnosis and importance of compliance with the treatment plan as well as documenting on the day of the visit. This visit was completed virtually using Doxy. me     Follow up from hospital today via video; requested video today due to virus in

## 2021-09-07 DIAGNOSIS — Z12.31 BREAST CANCER SCREENING BY MAMMOGRAM: Primary | ICD-10-CM

## 2021-10-04 RX ORDER — ERGOCALCIFEROL 1.25 MG/1
50000 CAPSULE ORAL WEEKLY
Qty: 12 CAPSULE | Refills: 0 | Status: SHIPPED | OUTPATIENT
Start: 2021-10-04

## 2021-10-27 ENCOUNTER — TELEPHONE (OUTPATIENT)
Dept: ADMINISTRATIVE | Age: 56
End: 2021-10-27

## 2021-10-27 NOTE — TELEPHONE ENCOUNTER
Ho Simon from [address] [place] [address] is calling to follow up on an order that was sent for this patients oxygen. It was sent 10/20/2021, have not had a reply. Renzo December. Please return the call to [phone]. No reference number just provide the name and .     Thank you

## 2021-12-03 ENCOUNTER — TELEPHONE (OUTPATIENT)
Dept: PRIMARY CARE CLINIC | Age: 56
End: 2021-12-03

## 2021-12-03 NOTE — TELEPHONE ENCOUNTER
----- Message from Douglas Duaret sent at 12/3/2021  8:43 AM EST -----  Subject: Message to Provider    QUESTIONS  Information for Provider? Patient's daughter called to let Linda William know   that Ms. Cynthia Fur has passed away. She wanted to let him know that she did   have COVID when she passed away, I don't believe they know if that was the   COD or not. If you need to call Patrice olmos [phone]  ---------------------------------------------------------------------------  --------------  José Luis VANN  What is the best way for the office to contact you? OK to leave message on   voicemail  Preferred Call Back Phone Number? [phone]  ---------------------------------------------------------------------------  --------------  SCRIPT ANSWERS  Relationship to Patient? Third Party  Representative Name?  Malu Bucio